# Patient Record
Sex: FEMALE | Race: WHITE | NOT HISPANIC OR LATINO | Employment: OTHER | URBAN - METROPOLITAN AREA
[De-identification: names, ages, dates, MRNs, and addresses within clinical notes are randomized per-mention and may not be internally consistent; named-entity substitution may affect disease eponyms.]

---

## 2023-04-15 LAB — URINE CULTURE: ABNORMAL

## 2023-04-28 LAB — URINE CULTURE: NORMAL

## 2023-08-28 LAB
ANION GAP IN SER/PLAS: 20 MMOL/L (ref 10–20)
CALCIUM (MG/DL) IN SER/PLAS: 10.2 MG/DL (ref 8.6–10.6)
CARBON DIOXIDE, TOTAL (MMOL/L) IN SER/PLAS: 22 MMOL/L (ref 21–32)
CHLORIDE (MMOL/L) IN SER/PLAS: 107 MMOL/L (ref 98–107)
CREATININE (MG/DL) IN SER/PLAS: 1.01 MG/DL (ref 0.5–1.05)
GFR FEMALE: 57 ML/MIN/1.73M2
GLUCOSE (MG/DL) IN SER/PLAS: 145 MG/DL (ref 74–99)
POTASSIUM (MMOL/L) IN SER/PLAS: 3.9 MMOL/L (ref 3.5–5.3)
SODIUM (MMOL/L) IN SER/PLAS: 145 MMOL/L (ref 136–145)
UREA NITROGEN (MG/DL) IN SER/PLAS: 21 MG/DL (ref 6–23)

## 2023-09-08 LAB
ALANINE AMINOTRANSFERASE (SGPT) (U/L) IN SER/PLAS: 36 U/L (ref 7–45)
ALBUMIN (G/DL) IN SER/PLAS: 4 G/DL (ref 3.4–5)
ALKALINE PHOSPHATASE (U/L) IN SER/PLAS: 121 U/L (ref 33–136)
ANION GAP IN SER/PLAS: 17 MMOL/L (ref 10–20)
APPEARANCE, URINE: ABNORMAL
ASPARTATE AMINOTRANSFERASE (SGOT) (U/L) IN SER/PLAS: 28 U/L (ref 9–39)
BACTERIA, URINE: ABNORMAL /HPF
BILIRUBIN TOTAL (MG/DL) IN SER/PLAS: 0.4 MG/DL (ref 0–1.2)
BILIRUBIN, URINE: NEGATIVE
BLOOD, URINE: NEGATIVE
CALCIDIOL (25 OH VITAMIN D3) (NG/ML) IN SER/PLAS: 78 NG/ML
CALCIUM (MG/DL) IN SER/PLAS: 10 MG/DL (ref 8.6–10.6)
CARBON DIOXIDE, TOTAL (MMOL/L) IN SER/PLAS: 25 MMOL/L (ref 21–32)
CHLORIDE (MMOL/L) IN SER/PLAS: 104 MMOL/L (ref 98–107)
CHOLESTEROL (MG/DL) IN SER/PLAS: 254 MG/DL (ref 0–199)
CHOLESTEROL IN HDL (MG/DL) IN SER/PLAS: 43.9 MG/DL
CHOLESTEROL/HDL RATIO: 5.8
COBALAMIN (VITAMIN B12) (PG/ML) IN SER/PLAS: 263 PG/ML (ref 211–911)
COLOR, URINE: YELLOW
CREATININE (MG/DL) IN SER/PLAS: 1.07 MG/DL (ref 0.5–1.05)
ESTIMATED AVERAGE GLUCOSE FOR HBA1C: 146 MG/DL
GFR FEMALE: 53 ML/MIN/1.73M2
GLUCOSE (MG/DL) IN SER/PLAS: 104 MG/DL (ref 74–99)
GLUCOSE, URINE: NEGATIVE MG/DL
HEMOGLOBIN A1C/HEMOGLOBIN TOTAL IN BLOOD: 6.7 %
KETONES, URINE: NEGATIVE MG/DL
LDL: 147 MG/DL (ref 0–99)
LEUKOCYTE ESTERASE, URINE: ABNORMAL
NITRITE, URINE: NEGATIVE
NON HDL CHOLESTEROL: 210 MG/DL
PH, URINE: 6 (ref 5–8)
POTASSIUM (MMOL/L) IN SER/PLAS: 5.1 MMOL/L (ref 3.5–5.3)
PROTEIN TOTAL: 6.5 G/DL (ref 6.4–8.2)
PROTEIN, URINE: NEGATIVE MG/DL
RBC, URINE: 1 /HPF (ref 0–5)
SEDIMENTATION RATE, ERYTHROCYTE: 57 MM/H (ref 0–30)
SODIUM (MMOL/L) IN SER/PLAS: 141 MMOL/L (ref 136–145)
SPECIFIC GRAVITY, URINE: 1.02 (ref 1–1.03)
SQUAMOUS EPITHELIAL CELLS, URINE: 6 /HPF
THYROTROPIN (MIU/L) IN SER/PLAS BY DETECTION LIMIT <= 0.05 MIU/L: 1.16 MIU/L (ref 0.44–3.98)
TRIGLYCERIDE (MG/DL) IN SER/PLAS: 317 MG/DL (ref 0–149)
UREA NITROGEN (MG/DL) IN SER/PLAS: 23 MG/DL (ref 6–23)
UROBILINOGEN, URINE: <2 MG/DL (ref 0–1.9)
VLDL: 63 MG/DL (ref 0–40)
WBC CLUMPS, URINE: ABNORMAL /HPF
WBC, URINE: 24 /HPF (ref 0–5)

## 2023-09-10 LAB — URINE CULTURE: ABNORMAL

## 2023-09-30 PROBLEM — N39.3 FEMALE STRESS INCONTINENCE: Status: ACTIVE | Noted: 2023-09-30

## 2023-09-30 PROBLEM — R32 URINARY INCONTINENCE: Status: ACTIVE | Noted: 2023-09-30

## 2023-09-30 PROBLEM — N95.2 ATROPHIC VAGINITIS: Status: ACTIVE | Noted: 2023-09-30

## 2023-09-30 PROBLEM — N39.41 URGE INCONTINENCE: Status: ACTIVE | Noted: 2023-09-30

## 2023-09-30 PROBLEM — N39.0 CHRONIC UTI: Status: ACTIVE | Noted: 2023-09-30

## 2023-09-30 PROBLEM — N81.89 PELVIC FLOOR WEAKNESS: Status: ACTIVE | Noted: 2023-09-30

## 2023-09-30 RX ORDER — MIRABEGRON 50 MG/1
TABLET, FILM COATED, EXTENDED RELEASE ORAL
Status: ON HOLD | COMMUNITY
Start: 2023-02-06 | End: 2023-10-21 | Stop reason: ALTCHOICE

## 2023-09-30 RX ORDER — FLUTICASONE PROPIONATE AND SALMETEROL 250; 50 UG/1; UG/1
1 POWDER RESPIRATORY (INHALATION)
COMMUNITY
Start: 2023-04-07

## 2023-09-30 RX ORDER — ALBUTEROL SULFATE 90 UG/1
2 AEROSOL, METERED RESPIRATORY (INHALATION)
COMMUNITY

## 2023-09-30 RX ORDER — CIPROFLOXACIN 250 MG/1
1 TABLET, FILM COATED ORAL EVERY 12 HOURS
Status: ON HOLD | COMMUNITY
Start: 2022-11-28 | End: 2023-10-21

## 2023-09-30 RX ORDER — FUROSEMIDE 10 MG/ML
10 SOLUTION ORAL
COMMUNITY
End: 2023-10-23 | Stop reason: HOSPADM

## 2023-09-30 RX ORDER — MONTELUKAST SODIUM 10 MG/1
10 TABLET ORAL DAILY
COMMUNITY

## 2023-09-30 RX ORDER — VIT C/E/ZN/COPPR/LUTEIN/ZEAXAN 250MG-90MG
25 CAPSULE ORAL DAILY
COMMUNITY

## 2023-09-30 RX ORDER — ROSUVASTATIN CALCIUM 5 MG/1
10 TABLET, COATED ORAL DAILY
COMMUNITY

## 2023-09-30 RX ORDER — FLUCONAZOLE 150 MG/1
TABLET ORAL
COMMUNITY
Start: 2022-11-15

## 2023-09-30 RX ORDER — LEFLUNOMIDE 20 MG/1
20 TABLET ORAL DAILY
COMMUNITY

## 2023-09-30 RX ORDER — FAMOTIDINE 20 MG/1
TABLET, FILM COATED ORAL
COMMUNITY

## 2023-09-30 RX ORDER — CETIRIZINE HYDROCHLORIDE 10 MG/1
TABLET ORAL
COMMUNITY
End: 2023-10-23 | Stop reason: HOSPADM

## 2023-09-30 RX ORDER — LEVOTHYROXINE SODIUM 150 UG/1
150 TABLET ORAL
COMMUNITY

## 2023-09-30 RX ORDER — RIVASTIGMINE TARTRATE 1.5 MG/1
1.5 CAPSULE ORAL 2 TIMES DAILY
COMMUNITY

## 2023-09-30 RX ORDER — PREGABALIN 75 MG/1
75 CAPSULE ORAL DAILY
COMMUNITY

## 2023-09-30 RX ORDER — NORTRIPTYLINE HYDROCHLORIDE 50 MG/1
CAPSULE ORAL
COMMUNITY

## 2023-09-30 RX ORDER — FLUTICASONE FUROATE, UMECLIDINIUM BROMIDE AND VILANTEROL TRIFENATATE 200; 62.5; 25 UG/1; UG/1; UG/1
25 POWDER RESPIRATORY (INHALATION) DAILY PRN
COMMUNITY

## 2023-09-30 RX ORDER — ESTRADIOL 2 MG/1
SYSTEM VAGINAL
COMMUNITY
Start: 2022-11-30 | End: 2023-11-10 | Stop reason: SDUPTHER

## 2023-09-30 RX ORDER — ESCITALOPRAM OXALATE 20 MG/1
20 TABLET ORAL DAILY
COMMUNITY

## 2023-09-30 RX ORDER — ESOMEPRAZOLE MAGNESIUM 40 MG/1
40 GRANULE, DELAYED RELEASE ORAL
COMMUNITY

## 2023-09-30 RX ORDER — NITROFURANTOIN 25; 75 MG/1; MG/1
CAPSULE ORAL
COMMUNITY
Start: 2023-04-13 | End: 2023-10-23 | Stop reason: HOSPADM

## 2023-09-30 RX ORDER — NITROFURANTOIN MACROCRYSTALS 50 MG/1
50 CAPSULE ORAL
COMMUNITY
Start: 2022-11-30 | End: 2023-10-23 | Stop reason: HOSPADM

## 2023-10-04 ENCOUNTER — ALLIED HEALTH (OUTPATIENT)
Dept: INTEGRATIVE MEDICINE | Facility: CLINIC | Age: 78
End: 2023-10-04
Payer: MEDICARE

## 2023-10-04 DIAGNOSIS — M54.59 OTHER LOW BACK PAIN: Primary | ICD-10-CM

## 2023-10-04 PROCEDURE — 97811 ACUP 1/> W/O ESTIM EA ADD 15: CPT | Performed by: ACUPUNCTURIST

## 2023-10-04 PROCEDURE — 97810 ACUP 1/> WO ESTIM 1ST 15 MIN: CPT | Performed by: ACUPUNCTURIST

## 2023-10-04 NOTE — PROGRESS NOTES
Follow up visit  2 of 12.  Immediate pt reported benefits of decrease in pain lasted 2 days in duration.  Since last tx pt reports symptoms of low back spreading from left to right hip medium  in intensity and constant in  frequency.   Pt also observed the following changes that pain stops when at rest.  She rests for about 1 hour.  She is able to do some house based activity she can go for awhile.  If activity includes bending she has to rest after 30 min.  Acupuncture Visit:     Subjective   Patient ID: Kenisha Bergeron is a 77 y.o. female who presents for No chief complaint on file.  HPI    Session Information  Is this acupuncture treatment being billed to the patient's insurance company: Yes  This is visit number: 2  The patient has a total number of visits of: 12  Initial Acupuncture Treatment date: 09/13/23  Name of Insurance Company: Medicare A/B  Name of Supervising Physician: ELSIE Strange  Visit Type: Follow-up visit         Review of Systems         Provider reviewed plan for the acupuncture session, precautions and contraindications. Patient/guardian/hospital staff has given consent to treat with full understanding of what to expect during the session. Before acupuncture began, provider explained to the patient to communicate at any time if the procedure was causing discomfort past their tolerance level. Patient agreed to advise acupuncturist. The acupuncturist counseled the patient on the risks of acupuncture treatment including pain, infection, bleeding, and no relief of pain. The patient was positioned comfortably. There was no evidence of infection at the site of needle insertions.    Objective   Physical Exam         Treatment Plan  Treatment Goals:  (decrease the intensity duration and frequency of pain)  Pattern Differentiation: st qi deficiency, oketsu, adrenal imbalance  Treatment Principle: move qi and blood regulate adrenals    Acupuncture Treatment  Patient Position: Supine on a  table  Acupuncture Needling: Yes  Needle Guage: 40 guage /.16/ Red seirin  Body Points: With retention  Body Points - Left: k 7, 10  Body Points - Bilateral: gb 41,lr, 3, sj 5, li4, st qi x2  Body Points - Right: k 3, 9  Auricular Points: No  Electroacupuncture Used: No  Other Techniques Utilized: Topicals  Topicals Description: orange peppermint with spirit quieting oil  Needle Count In: 16  Needle Count Out: 16  Needle Retention Time (min): 25 minutes  Total Face to Face Time (min): 45 minutes              Assessment/Plan

## 2023-10-04 NOTE — PATIENT INSTRUCTIONS
Pt tolerated tx well.   Advised to engage in gentle activity following tx and hydrate.  Schedule 4-6 follow tx with re-evaluation

## 2023-10-18 ENCOUNTER — LAB (OUTPATIENT)
Dept: LAB | Facility: LAB | Age: 78
End: 2023-10-18
Payer: MEDICARE

## 2023-10-18 DIAGNOSIS — N39.0 URINARY TRACT INFECTION, SITE NOT SPECIFIED: Primary | ICD-10-CM

## 2023-10-18 LAB
BACTERIA #/AREA URNS AUTO: ABNORMAL /HPF
MUCOUS THREADS #/AREA URNS AUTO: ABNORMAL /LPF
RBC #/AREA URNS AUTO: ABNORMAL /HPF
SQUAMOUS #/AREA URNS AUTO: ABNORMAL /HPF
WBC #/AREA URNS AUTO: ABNORMAL /HPF
WBC CLUMPS #/AREA URNS AUTO: ABNORMAL /HPF

## 2023-10-18 PROCEDURE — 81001 URINALYSIS AUTO W/SCOPE: CPT

## 2023-10-18 PROCEDURE — 87086 URINE CULTURE/COLONY COUNT: CPT

## 2023-10-18 PROCEDURE — 87186 SC STD MICRODIL/AGAR DIL: CPT

## 2023-10-20 ENCOUNTER — APPOINTMENT (OUTPATIENT)
Dept: RADIOLOGY | Facility: HOSPITAL | Age: 78
DRG: 690 | End: 2023-10-20
Payer: MEDICARE

## 2023-10-20 ENCOUNTER — HOSPITAL ENCOUNTER (INPATIENT)
Facility: HOSPITAL | Age: 78
LOS: 2 days | Discharge: HOME | DRG: 690 | End: 2023-10-23
Attending: GENERAL PRACTICE | Admitting: INTERNAL MEDICINE
Payer: MEDICARE

## 2023-10-20 DIAGNOSIS — A41.4: Primary | ICD-10-CM

## 2023-10-20 DIAGNOSIS — Z16.24 NEWLY DIAGNOSED INFECTION DUE TO MULTIDRUG RESISTANT ORGANISM: ICD-10-CM

## 2023-10-20 DIAGNOSIS — I10 PRIMARY HYPERTENSION: ICD-10-CM

## 2023-10-20 LAB
ALBUMIN SERPL BCP-MCNC: 3.8 G/DL (ref 3.4–5)
ALP SERPL-CCNC: 113 U/L (ref 33–136)
ALT SERPL W P-5'-P-CCNC: 37 U/L (ref 7–45)
ANION GAP SERPL CALC-SCNC: 15 MMOL/L (ref 10–20)
APPEARANCE UR: ABNORMAL
AST SERPL W P-5'-P-CCNC: 36 U/L (ref 9–39)
BACTERIA #/AREA URNS AUTO: ABNORMAL /HPF
BACTERIA UR CULT: ABNORMAL
BASOPHILS # BLD AUTO: 0.15 X10*3/UL (ref 0–0.1)
BASOPHILS NFR BLD AUTO: 0.9 %
BILIRUB SERPL-MCNC: 0.3 MG/DL (ref 0–1.2)
BILIRUB UR STRIP.AUTO-MCNC: NEGATIVE MG/DL
BUN SERPL-MCNC: 25 MG/DL (ref 6–23)
CALCIUM SERPL-MCNC: 8.7 MG/DL (ref 8.6–10.3)
CHLORIDE SERPL-SCNC: 106 MMOL/L (ref 98–107)
CO2 SERPL-SCNC: 21 MMOL/L (ref 21–32)
COLOR UR: YELLOW
CREAT SERPL-MCNC: 1.1 MG/DL (ref 0.5–1.05)
EOSINOPHIL # BLD AUTO: 0.2 X10*3/UL (ref 0–0.4)
EOSINOPHIL NFR BLD AUTO: 1.2 %
ERYTHROCYTE [DISTWIDTH] IN BLOOD BY AUTOMATED COUNT: 14.2 % (ref 11.5–14.5)
GFR SERPL CREATININE-BSD FRML MDRD: 52 ML/MIN/1.73M*2
GLUCOSE SERPL-MCNC: 144 MG/DL (ref 74–99)
GLUCOSE UR STRIP.AUTO-MCNC: NEGATIVE MG/DL
HCT VFR BLD AUTO: 44.2 % (ref 36–46)
HGB BLD-MCNC: 13.7 G/DL (ref 12–16)
HOLD SPECIMEN: 293
IMM GRANULOCYTES # BLD AUTO: 0.23 X10*3/UL (ref 0–0.5)
IMM GRANULOCYTES NFR BLD AUTO: 1.3 % (ref 0–0.9)
KETONES UR STRIP.AUTO-MCNC: NEGATIVE MG/DL
LEUKOCYTE ESTERASE UR QL STRIP.AUTO: ABNORMAL
LYMPHOCYTES # BLD AUTO: 4.41 X10*3/UL (ref 0.8–3)
LYMPHOCYTES NFR BLD AUTO: 25.4 %
MCH RBC QN AUTO: 28 PG (ref 26–34)
MCHC RBC AUTO-ENTMCNC: 31 G/DL (ref 32–36)
MCV RBC AUTO: 90 FL (ref 80–100)
MONOCYTES # BLD AUTO: 1.94 X10*3/UL (ref 0.05–0.8)
MONOCYTES NFR BLD AUTO: 11.2 %
MUCOUS THREADS #/AREA URNS AUTO: ABNORMAL /LPF
NEUTROPHILS # BLD AUTO: 10.44 X10*3/UL (ref 1.6–5.5)
NEUTROPHILS NFR BLD AUTO: 60 %
NITRITE UR QL STRIP.AUTO: NEGATIVE
NRBC BLD-RTO: 0 /100 WBCS (ref 0–0)
PH UR STRIP.AUTO: 5 [PH]
PLATELET # BLD AUTO: 322 X10*3/UL (ref 150–450)
PMV BLD AUTO: 10.5 FL (ref 7.5–11.5)
POTASSIUM SERPL-SCNC: 4.1 MMOL/L (ref 3.5–5.3)
PROT SERPL-MCNC: 6.3 G/DL (ref 6.4–8.2)
PROT UR STRIP.AUTO-MCNC: NEGATIVE MG/DL
RBC # BLD AUTO: 4.89 X10*6/UL (ref 4–5.2)
RBC # UR STRIP.AUTO: NEGATIVE /UL
RBC #/AREA URNS AUTO: ABNORMAL /HPF
SODIUM SERPL-SCNC: 138 MMOL/L (ref 136–145)
SP GR UR STRIP.AUTO: 1.01
SQUAMOUS #/AREA URNS AUTO: ABNORMAL /HPF
UROBILINOGEN UR STRIP.AUTO-MCNC: <2 MG/DL
WBC # BLD AUTO: 17.4 X10*3/UL (ref 4.4–11.3)
WBC #/AREA URNS AUTO: ABNORMAL /HPF

## 2023-10-20 PROCEDURE — 81001 URINALYSIS AUTO W/SCOPE: CPT | Performed by: GENERAL PRACTICE

## 2023-10-20 PROCEDURE — 36415 COLL VENOUS BLD VENIPUNCTURE: CPT | Performed by: PHYSICIAN ASSISTANT

## 2023-10-20 PROCEDURE — 85025 COMPLETE CBC W/AUTO DIFF WBC: CPT | Performed by: PHYSICIAN ASSISTANT

## 2023-10-20 PROCEDURE — 2550000001 HC RX 255 CONTRASTS: Performed by: GENERAL PRACTICE

## 2023-10-20 PROCEDURE — 80053 COMPREHEN METABOLIC PANEL: CPT | Performed by: PHYSICIAN ASSISTANT

## 2023-10-20 PROCEDURE — 99285 EMERGENCY DEPT VISIT HI MDM: CPT | Mod: 25 | Performed by: GENERAL PRACTICE

## 2023-10-20 PROCEDURE — 2500000004 HC RX 250 GENERAL PHARMACY W/ HCPCS (ALT 636 FOR OP/ED): Performed by: PHYSICIAN ASSISTANT

## 2023-10-20 PROCEDURE — 81001 URINALYSIS AUTO W/SCOPE: CPT | Performed by: EMERGENCY MEDICINE

## 2023-10-20 PROCEDURE — 87040 BLOOD CULTURE FOR BACTERIA: CPT | Mod: AHULAB,CMCLAB | Performed by: PHYSICIAN ASSISTANT

## 2023-10-20 PROCEDURE — 74177 CT ABD & PELVIS W/CONTRAST: CPT | Performed by: RADIOLOGY

## 2023-10-20 PROCEDURE — 83605 ASSAY OF LACTIC ACID: CPT | Performed by: PHYSICIAN ASSISTANT

## 2023-10-20 PROCEDURE — 87086 URINE CULTURE/COLONY COUNT: CPT | Mod: AHULAB,CMCLAB | Performed by: GENERAL PRACTICE

## 2023-10-20 PROCEDURE — 74177 CT ABD & PELVIS W/CONTRAST: CPT | Mod: ME

## 2023-10-20 PROCEDURE — 86140 C-REACTIVE PROTEIN: CPT | Performed by: PHYSICIAN ASSISTANT

## 2023-10-20 PROCEDURE — 96365 THER/PROPH/DIAG IV INF INIT: CPT

## 2023-10-20 RX ORDER — MEROPENEM 1 G/1
1 INJECTION, POWDER, FOR SOLUTION INTRAVENOUS ONCE
Status: COMPLETED | OUTPATIENT
Start: 2023-10-20 | End: 2023-10-21

## 2023-10-20 RX ADMIN — MEROPENEM 1 G: 1 INJECTION, POWDER, FOR SOLUTION INTRAVENOUS at 23:06

## 2023-10-20 RX ADMIN — IOHEXOL 75 ML: 350 INJECTION, SOLUTION INTRAVENOUS at 23:43

## 2023-10-20 ASSESSMENT — PAIN - FUNCTIONAL ASSESSMENT: PAIN_FUNCTIONAL_ASSESSMENT: 0-10

## 2023-10-20 NOTE — Clinical Note
ED UM Review Complete - Patient Meets Inpatient Criteria  [email or web address]@

## 2023-10-20 NOTE — ED TRIAGE NOTES
Pt ambulatory to triage c/o UTI, pt has frequency, low abd pain.    Pt got Urine cultures back from [facility] and is resistant to many antibiotics, Primary told her to come for IV antibiotics/admit

## 2023-10-21 PROBLEM — A41.4: Status: ACTIVE | Noted: 2023-10-21

## 2023-10-21 LAB
APTT PPP: 35 SECONDS (ref 27–38)
BNP SERPL-MCNC: 32 PG/ML (ref 0–99)
CRP SERPL-MCNC: 1.68 MG/DL
INR PPP: 1.2 (ref 0.9–1.1)
LACTATE SERPL-SCNC: 1.9 MMOL/L (ref 0.4–2)
LACTATE SERPL-SCNC: 2.2 MMOL/L (ref 0.4–2)
PROTHROMBIN TIME: 13.7 SECONDS (ref 9.8–12.8)

## 2023-10-21 PROCEDURE — 1100000001 HC PRIVATE ROOM DAILY

## 2023-10-21 PROCEDURE — 85610 PROTHROMBIN TIME: CPT | Performed by: PHYSICIAN ASSISTANT

## 2023-10-21 PROCEDURE — 85730 THROMBOPLASTIN TIME PARTIAL: CPT | Performed by: PHYSICIAN ASSISTANT

## 2023-10-21 PROCEDURE — 2500000004 HC RX 250 GENERAL PHARMACY W/ HCPCS (ALT 636 FOR OP/ED): Performed by: INTERNAL MEDICINE

## 2023-10-21 PROCEDURE — 99222 1ST HOSP IP/OBS MODERATE 55: CPT | Performed by: INTERNAL MEDICINE

## 2023-10-21 PROCEDURE — 2500000004 HC RX 250 GENERAL PHARMACY W/ HCPCS (ALT 636 FOR OP/ED): Performed by: PHYSICIAN ASSISTANT

## 2023-10-21 PROCEDURE — 2500000001 HC RX 250 WO HCPCS SELF ADMINISTERED DRUGS (ALT 637 FOR MEDICARE OP): Performed by: INTERNAL MEDICINE

## 2023-10-21 PROCEDURE — 83880 ASSAY OF NATRIURETIC PEPTIDE: CPT | Performed by: PHYSICIAN ASSISTANT

## 2023-10-21 PROCEDURE — 83605 ASSAY OF LACTIC ACID: CPT | Performed by: PHYSICIAN ASSISTANT

## 2023-10-21 PROCEDURE — 36415 COLL VENOUS BLD VENIPUNCTURE: CPT | Performed by: PHYSICIAN ASSISTANT

## 2023-10-21 RX ORDER — ROSUVASTATIN CALCIUM 10 MG/1
10 TABLET, COATED ORAL NIGHTLY
Status: DISCONTINUED | OUTPATIENT
Start: 2023-10-21 | End: 2023-10-23 | Stop reason: HOSPADM

## 2023-10-21 RX ORDER — ESTRADIOL 0.1 MG/G
2 CREAM VAGINAL NIGHTLY
Status: DISCONTINUED | OUTPATIENT
Start: 2023-10-21 | End: 2023-10-21

## 2023-10-21 RX ORDER — BUDESONIDE 0.5 MG/2ML
0.5 INHALANT ORAL
Status: DISCONTINUED | OUTPATIENT
Start: 2023-10-21 | End: 2023-10-23 | Stop reason: HOSPADM

## 2023-10-21 RX ORDER — RIVASTIGMINE TARTRATE 1.5 MG/1
1.5 CAPSULE ORAL 2 TIMES DAILY
Status: DISCONTINUED | OUTPATIENT
Start: 2023-10-21 | End: 2023-10-23 | Stop reason: HOSPADM

## 2023-10-21 RX ORDER — PANTOPRAZOLE SODIUM 40 MG/1
40 TABLET, DELAYED RELEASE ORAL
Status: DISCONTINUED | OUTPATIENT
Start: 2023-10-22 | End: 2023-10-23 | Stop reason: HOSPADM

## 2023-10-21 RX ORDER — MEROPENEM 1 G/1
1 INJECTION, POWDER, FOR SOLUTION INTRAVENOUS EVERY 12 HOURS
Status: DISCONTINUED | OUTPATIENT
Start: 2023-10-21 | End: 2023-10-21

## 2023-10-21 RX ORDER — MEROPENEM 1 G/1
1 INJECTION, POWDER, FOR SOLUTION INTRAVENOUS ONCE
Status: DISCONTINUED | OUTPATIENT
Start: 2023-10-21 | End: 2023-10-22 | Stop reason: ALTCHOICE

## 2023-10-21 RX ORDER — ACETAMINOPHEN 325 MG/1
975 TABLET ORAL ONCE
Status: COMPLETED | OUTPATIENT
Start: 2023-10-21 | End: 2023-10-21

## 2023-10-21 RX ORDER — FLUTICASONE FUROATE AND VILANTEROL 100; 25 UG/1; UG/1
1 POWDER RESPIRATORY (INHALATION)
Status: DISCONTINUED | OUTPATIENT
Start: 2023-10-22 | End: 2023-10-21

## 2023-10-21 RX ORDER — LORATADINE 10 MG/1
10 TABLET ORAL DAILY
Status: DISCONTINUED | OUTPATIENT
Start: 2023-10-21 | End: 2023-10-23 | Stop reason: HOSPADM

## 2023-10-21 RX ORDER — PREGABALIN 75 MG/1
75 CAPSULE ORAL NIGHTLY
Status: DISCONTINUED | OUTPATIENT
Start: 2023-10-21 | End: 2023-10-23 | Stop reason: HOSPADM

## 2023-10-21 RX ORDER — ALBUTEROL SULFATE 90 UG/1
2 AEROSOL, METERED RESPIRATORY (INHALATION) EVERY 4 HOURS PRN
Status: DISCONTINUED | OUTPATIENT
Start: 2023-10-21 | End: 2023-10-21

## 2023-10-21 RX ORDER — LEFLUNOMIDE 10 MG/1
20 TABLET ORAL DAILY
Status: DISCONTINUED | OUTPATIENT
Start: 2023-10-21 | End: 2023-10-23 | Stop reason: HOSPADM

## 2023-10-21 RX ORDER — MONTELUKAST SODIUM 10 MG/1
10 TABLET ORAL NIGHTLY
Status: DISCONTINUED | OUTPATIENT
Start: 2023-10-21 | End: 2023-10-23 | Stop reason: HOSPADM

## 2023-10-21 RX ORDER — ALBUTEROL SULFATE 0.83 MG/ML
2.5 SOLUTION RESPIRATORY (INHALATION) EVERY 6 HOURS PRN
Status: DISCONTINUED | OUTPATIENT
Start: 2023-10-21 | End: 2023-10-23 | Stop reason: HOSPADM

## 2023-10-21 RX ORDER — ESCITALOPRAM OXALATE 20 MG/1
20 TABLET ORAL DAILY
Status: DISCONTINUED | OUTPATIENT
Start: 2023-10-21 | End: 2023-10-23 | Stop reason: HOSPADM

## 2023-10-21 RX ORDER — CHOLECALCIFEROL (VITAMIN D3) 25 MCG
1000 TABLET ORAL DAILY
Status: DISCONTINUED | OUTPATIENT
Start: 2023-10-22 | End: 2023-10-23 | Stop reason: HOSPADM

## 2023-10-21 RX ORDER — VIT C/E/ZN/COPPR/LUTEIN/ZEAXAN 250MG-90MG
1000 CAPSULE ORAL DAILY
Status: DISCONTINUED | OUTPATIENT
Start: 2023-10-21 | End: 2023-10-21

## 2023-10-21 RX ORDER — FAMOTIDINE 20 MG/1
20 TABLET, FILM COATED ORAL DAILY
Status: DISCONTINUED | OUTPATIENT
Start: 2023-10-21 | End: 2023-10-23 | Stop reason: HOSPADM

## 2023-10-21 RX ORDER — NORTRIPTYLINE HYDROCHLORIDE 25 MG/1
50 CAPSULE ORAL NIGHTLY
Status: DISCONTINUED | OUTPATIENT
Start: 2023-10-21 | End: 2023-10-23 | Stop reason: HOSPADM

## 2023-10-21 RX ORDER — FORMOTEROL FUMARATE DIHYDRATE 20 UG/2ML
20 SOLUTION RESPIRATORY (INHALATION)
Status: DISCONTINUED | OUTPATIENT
Start: 2023-10-21 | End: 2023-10-23 | Stop reason: HOSPADM

## 2023-10-21 RX ORDER — ESOMEPRAZOLE MAGNESIUM 40 MG/1
40 GRANULE, DELAYED RELEASE ORAL
Status: DISCONTINUED | OUTPATIENT
Start: 2023-10-22 | End: 2023-10-21

## 2023-10-21 RX ORDER — LEVOTHYROXINE SODIUM 75 UG/1
150 TABLET ORAL DAILY
Status: DISCONTINUED | OUTPATIENT
Start: 2023-10-22 | End: 2023-10-23 | Stop reason: HOSPADM

## 2023-10-21 RX ADMIN — SODIUM CHLORIDE 1000 ML: 9 INJECTION, SOLUTION INTRAVENOUS at 00:04

## 2023-10-21 RX ADMIN — ACETAMINOPHEN 975 MG: 325 TABLET ORAL at 02:15

## 2023-10-21 RX ADMIN — RIVASTIGMINE TARTRATE 1.5 MG: 1.5 CAPSULE ORAL at 22:07

## 2023-10-21 RX ADMIN — MONTELUKAST 10 MG: 10 TABLET, FILM COATED ORAL at 22:07

## 2023-10-21 RX ADMIN — ROSUVASTATIN 10 MG: 10 TABLET, FILM COATED ORAL at 22:06

## 2023-10-21 RX ADMIN — ACETAMINOPHEN 975 MG: 325 TABLET ORAL at 22:06

## 2023-10-21 RX ADMIN — PREGABALIN 75 MG: 75 CAPSULE ORAL at 22:06

## 2023-10-21 RX ADMIN — APIXABAN 5 MG: 5 TABLET, FILM COATED ORAL at 22:07

## 2023-10-21 RX ADMIN — Medication 1 G: at 12:04

## 2023-10-21 SDOH — SOCIAL STABILITY: SOCIAL INSECURITY: ARE YOU OR HAVE YOU BEEN THREATENED OR ABUSED PHYSICALLY, EMOTIONALLY, OR SEXUALLY BY ANYONE?: NO

## 2023-10-21 SDOH — SOCIAL STABILITY: SOCIAL INSECURITY: ABUSE: ADULT

## 2023-10-21 SDOH — SOCIAL STABILITY: SOCIAL INSECURITY: DOES ANYONE TRY TO KEEP YOU FROM HAVING/CONTACTING OTHER FRIENDS OR DOING THINGS OUTSIDE YOUR HOME?: NO

## 2023-10-21 SDOH — SOCIAL STABILITY: SOCIAL INSECURITY: HAS ANYONE EVER THREATENED TO HURT YOUR FAMILY OR YOUR PETS?: NO

## 2023-10-21 SDOH — SOCIAL STABILITY: SOCIAL INSECURITY: HAVE YOU HAD THOUGHTS OF HARMING ANYONE ELSE?: NO

## 2023-10-21 SDOH — HEALTH STABILITY: MENTAL HEALTH: EXPERIENCED ANY OF THE FOLLOWING LIFE EVENTS: DEATH OF FAMILY/FRIEND

## 2023-10-21 SDOH — SOCIAL STABILITY: SOCIAL INSECURITY: WERE YOU ABLE TO COMPLETE ALL THE BEHAVIORAL HEALTH SCREENINGS?: YES

## 2023-10-21 SDOH — SOCIAL STABILITY: SOCIAL INSECURITY: DO YOU FEEL UNSAFE GOING BACK TO THE PLACE WHERE YOU ARE LIVING?: NO

## 2023-10-21 SDOH — SOCIAL STABILITY: SOCIAL INSECURITY: ARE THERE ANY APPARENT SIGNS OF INJURIES/BEHAVIORS THAT COULD BE RELATED TO ABUSE/NEGLECT?: NO

## 2023-10-21 SDOH — SOCIAL STABILITY: SOCIAL INSECURITY: DO YOU FEEL ANYONE HAS EXPLOITED OR TAKEN ADVANTAGE OF YOU FINANCIALLY OR OF YOUR PERSONAL PROPERTY?: NO

## 2023-10-21 ASSESSMENT — ENCOUNTER SYMPTOMS
RESPIRATORY NEGATIVE: 1
PSYCHIATRIC NEGATIVE: 1
MUSCULOSKELETAL NEGATIVE: 1
CONSTITUTIONAL NEGATIVE: 1
ENDOCRINE NEGATIVE: 1
ABDOMINAL PAIN: 1
NEUROLOGICAL NEGATIVE: 1
EYES NEGATIVE: 1
HEMATOLOGIC/LYMPHATIC NEGATIVE: 1
ALLERGIC/IMMUNOLOGIC NEGATIVE: 1
CARDIOVASCULAR NEGATIVE: 1

## 2023-10-21 ASSESSMENT — LIFESTYLE VARIABLES
HOW MANY STANDARD DRINKS CONTAINING ALCOHOL DO YOU HAVE ON A TYPICAL DAY: PATIENT DOES NOT DRINK
HOW OFTEN DO YOU HAVE A DRINK CONTAINING ALCOHOL: MONTHLY OR LESS
PRESCIPTION_ABUSE_PAST_12_MONTHS: NO
SUBSTANCE_ABUSE_PAST_12_MONTHS: NO
AUDIT-C TOTAL SCORE: 1
AUDIT-C TOTAL SCORE: 1
SKIP TO QUESTIONS 9-10: 1
HOW OFTEN DO YOU HAVE 6 OR MORE DRINKS ON ONE OCCASION: NEVER

## 2023-10-21 ASSESSMENT — ACTIVITIES OF DAILY LIVING (ADL)
TOILETING: INDEPENDENT
WALKS IN HOME: INDEPENDENT
HEARING - LEFT EAR: FUNCTIONAL
FEEDING YOURSELF: INDEPENDENT
DRESSING YOURSELF: INDEPENDENT
PATIENT'S MEMORY ADEQUATE TO SAFELY COMPLETE DAILY ACTIVITIES?: YES
BATHING: INDEPENDENT
GROOMING: INDEPENDENT
HEARING - RIGHT EAR: FUNCTIONAL
JUDGMENT_ADEQUATE_SAFELY_COMPLETE_DAILY_ACTIVITIES: YES
ADEQUATE_TO_COMPLETE_ADL: YES

## 2023-10-21 ASSESSMENT — COGNITIVE AND FUNCTIONAL STATUS - GENERAL
STANDING UP FROM CHAIR USING ARMS: A LITTLE
MOBILITY SCORE: 20
DAILY ACTIVITIY SCORE: 24
WALKING IN HOSPITAL ROOM: A LITTLE
MOVING TO AND FROM BED TO CHAIR: A LITTLE
PATIENT BASELINE BEDBOUND: NO
CLIMB 3 TO 5 STEPS WITH RAILING: A LITTLE

## 2023-10-21 ASSESSMENT — PATIENT HEALTH QUESTIONNAIRE - PHQ9
2. FEELING DOWN, DEPRESSED OR HOPELESS: NOT AT ALL
SUM OF ALL RESPONSES TO PHQ9 QUESTIONS 1 & 2: 0
1. LITTLE INTEREST OR PLEASURE IN DOING THINGS: NOT AT ALL

## 2023-10-21 ASSESSMENT — PAIN SCALES - GENERAL
PAINLEVEL_OUTOF10: 0 - NO PAIN
PAINLEVEL_OUTOF10: 0 - NO PAIN
PAINLEVEL_OUTOF10: 3

## 2023-10-21 NOTE — CONSULTS
"INFECTIOUS DISEASE INPATIENT INITIAL CONSULTATION    Referred By: Jose Payton    Reason For Consult: Multidrug-resistant UTI     HPI:  This is a 77 y.o. female with PMH of obesity, NARA, RA, HTN, DLD, GERD who presented with low abd pain for a few days.    Was having suprapubic pain improving now. No dysuria. No fevers.    She takes Macrobid as UTI ppx. Admitted for IV abx given history of MDR UTIs.    Here is afebrile. WBC is 17. On IV Meropenem. Urine culture from 10/18 grew ESBL Klebsiella. CT A/P without acute process.     Allergies:  Amoxicillin, Penicillins, Povidone-iodine, and Sulfa (sulfonamide antibiotics)     Vitals (Last 24 Hours):  Heart Rate:  []   Temp:  [35.4 °C (95.8 °F)-36.9 °C (98.5 °F)]   Resp:  [14-23]   BP: (122-158)/()   Height:  [165.1 cm (5' 5\")]   Weight:  [99.8 kg (220 lb)]   SpO2:  [93 %-100 %]      PHYSICAL EXAM:  Gen - NAD  Heart - RRR, no murmurs  Lungs - clear bilaterally, no wheezing  Abd - soft, no ttp, BS present  Ext - no LE edema  Skin - no rash    MEDS:    Current Facility-Administered Medications:     meropenem (Merrem) in sodium chloride 0.9 % 100 mL IV 1 g, 1 g, intravenous, q12h, Jose RONI Payton DO    oxygen (O2) therapy, , inhalation, Continuous PRN - O2/gases, Elena Andersen PA-C, Rate Verify at 10/21/23 0340    sodium chloride 0.9 % bolus 1,000 mL, 1,000 mL, intravenous, Once, Elena Andersen PA-C     LABS:  Lab Results   Component Value Date    WBC 17.4 (H) 10/20/2023    HGB 13.7 10/20/2023    HCT 44.2 10/20/2023    MCV 90 10/20/2023     10/20/2023      Results from last 72 hours   Lab Units 10/20/23  2234   SODIUM mmol/L 138   POTASSIUM mmol/L 4.1   CHLORIDE mmol/L 106   CO2 mmol/L 21   BUN mg/dL 25*   CREATININE mg/dL 1.10*   GLUCOSE mg/dL 144*   CALCIUM mg/dL 8.7   ANION GAP mmol/L 15   EGFR mL/min/1.73m*2 52*     Results from last 72 hours   Lab Units 10/20/23  2234   ALK PHOS U/L 113   BILIRUBIN TOTAL mg/dL 0.3   PROTEIN TOTAL g/dL 6.3* "   ALT U/L 37   AST U/L 36   ALBUMIN g/dL 3.8     Estimated Creatinine Clearance: 49.3 mL/min (A) (by C-G formula based on SCr of 1.1 mg/dL (H)).  C-Reactive Protein   Date/Time Value Ref Range Status   10/20/2023 10:34 PM 1.68 (H) <1.00 mg/dL Final     Sedimentation Rate   Date/Time Value Ref Range Status   09/08/2023 12:42 PM 57 (H) 0 - 30 mm/h Final       IMAGING:  CT A/P  IMPRESSION:  No acute abdominal or pelvic process.      Hepatomegaly and hepatic steatosis.    ASSESSMENT/PLAN:    UTI due to ESBL Klebsiella - complicated given leukocytosis  Penicillin Allergy - will monitor for potential reaction with carbapenems although so far is OK    Abx options are quite limited given resistance and penicillin allergy. Will place on IV Ertapenem 1g Q24H. Plan for 3D course as long as she is improving, will end on Monday morning. This can with at home hospital if needed.    Will follow peripherally over the weekend. Please call me with questions. Thanks! D/w Dr. Heath Ingram MD  [facility]  Office #[phone]

## 2023-10-21 NOTE — H&P
History Of Present Illness  Kenisha Bergeron is a 77 y.o. female presenting with  past medical history of chronic UTIs, Class II obesity BMI 36.6hypothyroidism, hypertension, hyperlipidemia, GERD, rheumatoid arthritis and obstructive sleep apnea on CPAP who presented to [facility] complaining of lower abdominal pain for the past few days.  He was referred here by her primary care physician for IV antibiotics because she has a penicillin allergy and a history of multidrug-resistant UTIs.  She is on prophylactic antibiotics with nitrofurantoin.  She denies any fever or chills shortness of breath chest pain or diarrhea     Past Medical History  Obstructive sleep apnea  Ovarian cyst status post total abdominal hysterectomy    Surgical History  Past Surgical History:   Procedure Laterality Date    OTHER SURGICAL HISTORY  07/25/2022    Knee surgery    OTHER SURGICAL HISTORY  07/25/2022    Hysterectomy    OTHER SURGICAL HISTORY  07/25/2022    Tonsillectomy    OTHER SURGICAL HISTORY  07/25/2022    Foot surgery    OTHER SURGICAL HISTORY  07/25/2022    Jaw surgery    OTHER SURGICAL HISTORY  07/25/2022    Mid-urethral sling insertion         Social History  She reports that she has never smoked. She has never used smokeless tobacco. She reports that she does not drink alcohol and does not use drugs.    Family History  Family History   Problem Relation Name Age of Onset    Rheum arthritis Mother          with positive rheumatoid factor, involving unspecified site    Stomach cancer Father's Brother          Allergies  Amoxicillin, Penicillins, Povidone-iodine, and Sulfa (sulfonamide antibiotics)    Review of Systems   Constitutional: Negative.    HENT: Negative.     Eyes: Negative.    Respiratory: Negative.     Cardiovascular: Negative.    Gastrointestinal:  Positive for abdominal pain.   Endocrine: Negative.    Genitourinary: Negative.    Musculoskeletal: Negative.    Skin: Negative.    Allergic/Immunologic:  "Negative.    Neurological: Negative.    Hematological: Negative.    Psychiatric/Behavioral: Negative.     All other systems reviewed and are negative.       Physical Exam     Last Recorded Vitals  Blood pressure 153/80, pulse 106, temperature 36.7 °C (98.1 °F), temperature source Oral, resp. rate 18, height 1.651 m (5' 5\"), weight 99.8 kg (220 lb), SpO2 93 %.    Relevant Results  Meds:  Scheduled medications  meropenem, 1 g, intravenous, q12h  sodium chloride, 1,000 mL, intravenous, Once      Continuous medications     PRN medications  PRN medications: oxygen   Current Outpatient Medications   Medication Instructions    albuterol (Ventolin HFA) 90 mcg/actuation inhaler inhalation    apixaban (Eliquis) 5 mg tablet oral    cetirizine (ZyrTEC) 10 mg tablet oral    cholecalciferol (Vitamin D-3) 25 MCG (1000 UT) capsule oral    escitalopram (Lexapro) 20 mg tablet oral    esomeprazole (NexIUM Packet) 40 mg packet oral    estradiol (Estring) 2 mg (7.5 mcg /24 hour) vaginal ring INSERT 1 RING INTRAVAGINALLY EVERY 3 MONTHS.    famotidine (Pepcid) 20 mg tablet Famotidine 20 MG Oral Tablet   Refills: 0       Active    fluconazole (Diflucan) 150 mg tablet TAKE 1 TABLET NOW AND AGAIN IN 3 DAYS. REPEAT MONTHLY.    fluticasone-umeclidin-vilanter (Trelegy Ellipta) 200-62.5-25 mcg blister with device inhalation    furosemide (Lasix) 10 mg/mL solution oral    leflunomide (Arava) 20 mg tablet oral    levothyroxine (Synthroid, Levoxyl) 150 mcg tablet oral    montelukast (Singulair) 10 mg tablet oral    nitrofurantoin (Macrodantin) 50 mg capsule TAKE 1 CAPSULE Daily AFTER COMPLETING CIPROFLOXACIN    nitrofurantoin, macrocrystal-monohydrate, (Macrobid) 100 mg capsule     nortriptyline (Pamelor) 50 mg capsule oral    pregabalin (Lyrica) 75 mg capsule oral    rivastigmine (Exelon) 1.5 mg capsule oral    rosuvastatin (Crestor) 5 mg tablet oral    Wixela Inhub 250-50 mcg/dose diskus inhaler         Labs:  Results for orders placed or " performed during the hospital encounter of 10/20/23 (from the past 24 hour(s))   Urinalysis with Reflex Microscopic and Culture   Result Value Ref Range    Color, Urine Yellow Straw, Yellow    Appearance, Urine Hazy (N) Clear    Specific Gravity, Urine 1.011 1.005 - 1.035    pH, Urine 5.0 5.0, 5.5, 6.0, 6.5, 7.0, 7.5, 8.0    Protein, Urine NEGATIVE NEGATIVE mg/dL    Glucose, Urine NEGATIVE NEGATIVE mg/dL    Blood, Urine NEGATIVE NEGATIVE    Ketones, Urine NEGATIVE NEGATIVE mg/dL    Bilirubin, Urine NEGATIVE NEGATIVE    Urobilinogen, Urine <2.0 <2.0 mg/dL    Nitrite, Urine NEGATIVE NEGATIVE    Leukocyte Esterase, Urine SMALL (1+) (A) NEGATIVE   Extra Urine Gray Tube   Result Value Ref Range    Extra Tube 293    Microscopic Only, Urine   Result Value Ref Range    WBC, Urine 6-10 (A) 1-5, NONE /HPF    RBC, Urine 1-2 NONE, 1-2, 3-5 /HPF    Squamous Epithelial Cells, Urine 1-9 (SPARSE) Reference range not established. /HPF    Bacteria, Urine 4+ (A) NONE SEEN /HPF    Mucus, Urine 2+ Reference range not established. /LPF   CBC and Auto Differential   Result Value Ref Range    WBC 17.4 (H) 4.4 - 11.3 x10*3/uL    nRBC 0.0 0.0 - 0.0 /100 WBCs    RBC 4.89 4.00 - 5.20 x10*6/uL    Hemoglobin 13.7 12.0 - 16.0 g/dL    Hematocrit 44.2 36.0 - 46.0 %    MCV 90 80 - 100 fL    MCH 28.0 26.0 - 34.0 pg    MCHC 31.0 (L) 32.0 - 36.0 g/dL    RDW 14.2 11.5 - 14.5 %    Platelets 322 150 - 450 x10*3/uL    MPV 10.5 7.5 - 11.5 fL    Neutrophils % 60.0 40.0 - 80.0 %    Immature Granulocytes %, Automated 1.3 (H) 0.0 - 0.9 %    Lymphocytes % 25.4 13.0 - 44.0 %    Monocytes % 11.2 2.0 - 10.0 %    Eosinophils % 1.2 0.0 - 6.0 %    Basophils % 0.9 0.0 - 2.0 %    Neutrophils Absolute 10.44 (H) 1.60 - 5.50 x10*3/uL    Immature Granulocytes Absolute, Automated 0.23 0.00 - 0.50 x10*3/uL    Lymphocytes Absolute 4.41 (H) 0.80 - 3.00 x10*3/uL    Monocytes Absolute 1.94 (H) 0.05 - 0.80 x10*3/uL    Eosinophils Absolute 0.20 0.00 - 0.40 x10*3/uL    Basophils  Absolute 0.15 (H) 0.00 - 0.10 x10*3/uL   Comprehensive metabolic panel   Result Value Ref Range    Glucose 144 (H) 74 - 99 mg/dL    Sodium 138 136 - 145 mmol/L    Potassium 4.1 3.5 - 5.3 mmol/L    Chloride 106 98 - 107 mmol/L    Bicarbonate 21 21 - 32 mmol/L    Anion Gap 15 10 - 20 mmol/L    Urea Nitrogen 25 (H) 6 - 23 mg/dL    Creatinine 1.10 (H) 0.50 - 1.05 mg/dL    eGFR 52 (L) >60 mL/min/1.73m*2    Calcium 8.7 8.6 - 10.3 mg/dL    Albumin 3.8 3.4 - 5.0 g/dL    Alkaline Phosphatase 113 33 - 136 U/L    Total Protein 6.3 (L) 6.4 - 8.2 g/dL    AST 36 9 - 39 U/L    Bilirubin, Total 0.3 0.0 - 1.2 mg/dL    ALT 37 7 - 45 U/L   C-reactive protein   Result Value Ref Range    C-Reactive Protein 1.68 (H) <1.00 mg/dL   Lactate   Result Value Ref Range    Lactate 2.2 (H) 0.4 - 2.0 mmol/L   Lactate   Result Value Ref Range    Lactate 1.9 0.4 - 2.0 mmol/L   Protime-INR   Result Value Ref Range    Protime 13.7 (H) 9.8 - 12.8 seconds    INR 1.2 (H) 0.9 - 1.1   APTT   Result Value Ref Range    aPTT 35 27 - 38 seconds   B-type natriuretic peptide   Result Value Ref Range    BNP 32 0 - 99 pg/mL      Imaging:  CT abdomen pelvis w IV contrast    Result Date: 10/21/2023  Interpreted By:  Melony Fox, STUDY: CT ABDOMEN PELVIS W IV CONTRAST;  10/20/2023 11:49 pm   INDICATION: Signs/Symptoms:lower abd pain.   COMPARISON: None.   ACCESSION NUMBER(S): [id number]   ORDERING CLINICIAN: FERMIN NEGRON   TECHNIQUE: Axial CT images of the abdomen and pelvis with coronal and sagittal reconstructed images obtained after intravenous administration of contrast   FINDINGS: LOWER CHEST: No acute abnormality of the lung bases. BONES: No acute osseous abnormality. Laminectomy and posterior fusion hardware at L3-4. Multilevel degenerative disc changes, severe at L1-2. ABDOMINAL WALL: Within normal limits.   ABDOMEN:   LIVER: Enlarged, 22 cm in craniocaudad length. Diffuse fatty infiltration. Additional focal hypodensities in the left right lobes  measuring up to 1.8 cm, favored to represent cysts. BILE DUCTS: No biliary dilatation. GALLBLADDER: No calcified gallstones. No pericholecystic inflammatory changes. PANCREAS: Within normal limits. SPLEEN: Within normal limits. ADRENALS: Within normal limits. KIDNEYS and URETERS: Symmetric renal enhancement. No hydronephrosis or perinephric fluid collection.     VESSELS: No aortic aneurysm. RETROPERITONEUM: No pathologically enlarged retroperitoneal lymph nodes.   PELVIS:   REPRODUCTIVE ORGANS: Hysterectomy. BLADDER: Within normal limits.   BOWEL: No dilated bowel. Rectosigmoid anastomosis. Colonic diverticulosis without acute diverticulitis. The appendix is not seen. No pericecal inflammatory changes to suggest acute appendicitis. PERITONEUM: No ascites or free air, no fluid collection.       No acute abdominal or pelvic process.   Hepatomegaly and hepatic steatosis.   MACRO: None   Signed by: Melony Fox 10/21/2023 12:41 AM Dictation workstation:   [id number]       Assessment/Plan   Multidrug-resistant UTI  Plan:  Meropenem 1 g IV every 12 hours  ID consultation    Obstructive sleep apnea  Plan:  Resume nocturnal CPAP    Class II obesity BMI 36  Plan:  Lifestyle modification    For chronic medical illnesses  Plan:  Resume home medications when list becomes available    I spent 60 minutes in the professional and overall care of this patient.      Jose Payton DO

## 2023-10-21 NOTE — CARE PLAN
The patient's goals for the shift include pt to remain safe    The clinical goals for the shift include pt to remain stable through

## 2023-10-21 NOTE — PROGRESS NOTES
Patient seen and examined.  Admitted overnight for multidrug-resistant UTI.  Follow-up on urine culture results.  Continue meropenem.  ID consult.  Monitor.

## 2023-10-21 NOTE — ED PROVIDER NOTES
HPI   Chief Complaint   Patient presents with    Urinary Frequency   HPI  Patient is 77-year-old female with complicated medical history that includes COPD, chronic UTIs, hypothyroidism, HTN, HLD, GERD, RA, NARA on CPAP, Alzheimer's the presents to the ED at the referral of PCP for IV antibiotics due to multidrug-resistant UTI.  Patient gets chronic UTIs is on prophylactic nitrofurantoin.     Patient History   History reviewed. No pertinent past medical history.  Past Surgical History:   Procedure Laterality Date    OTHER SURGICAL HISTORY  07/25/2022    Knee surgery    OTHER SURGICAL HISTORY  07/25/2022    Hysterectomy    OTHER SURGICAL HISTORY  07/25/2022    Tonsillectomy    OTHER SURGICAL HISTORY  07/25/2022    Foot surgery    OTHER SURGICAL HISTORY  07/25/2022    Jaw surgery    OTHER SURGICAL HISTORY  07/25/2022    Mid-urethral sling insertion     Family History   Problem Relation Name Age of Onset    Rheum arthritis Mother          with positive rheumatoid factor, involving unspecified site    Stomach cancer Father's Brother       Social History     Tobacco Use    Smoking status: Never    Smokeless tobacco: Never   Substance Use Topics    Alcohol use: Never    Drug use: Never     Physical Exam   ED Triage Vitals [10/20/23 1852]   Temp Heart Rate Resp BP   35.4 °C (95.8 °F) 81 18 (!) 144/96      SpO2 Temp Source Heart Rate Source Patient Position   100 % Oral -- Sitting      BP Location FiO2 (%)     Right arm --       Physical Exam    ED Course & MDM   ED Course as of 10/22/23 1504   Fri Oct 20, 2023   2202 Vitals Reviewed: Afebrile. Hypertensive. Not tachycardic nor tachypneic. No hypoxia.   [KA]   2202 Review of patient's chart shows that she had urine culture on 10/18/2023 that was positive for Klebsiella pneumoniae and is resistant to every medication except ertapenem and meropenem.  Will order dose of IV meropenem along with labs, blood cultures. [KA]   2222 Urinary frequency with q20 min trips to . Lower  abd pain since yesterday. Dull/achy. No radiation. Drinking extra fluid to help avoid UTI. No hematuria. Endorses nausea w/o emesis. No fever, chills, CP. 2d ago non-bloody loose stool. No recent illness. No travel. Saw urologist who told her that probs stem from inability to fully empty bladder, chronic urinary retention. S/p ovarian cyst and hysterectomy. No CKD. No leg swelling. Brother bladder cancer and CKD. Facial swelling with Pcn as a child. Same sulfa.  [KA]   2346 I personally reviewed labs.  CMP shows creatinine similar to baseline.  CBC shows leukocytosis of 17.4.  Will add lactate and initiate sepsis protocol.  Patient meets SIRS criteria of tachycardia, leukocytosis and known source of infection.  Patient did wait in ED waiting room for almost 4 hours prior to being given room or having labs drawn.  Blood cultures and meropenem already started based on urine culture report from 18th.    Patient has history of urinary retention. Postvoid residual bladder scan showed 66 cc urine.  Chose not to insert Kumar catheter at this time. [KA]   Sat Oct 21, 2023   0040 Lactate 2.2.  Spoke with Dr. Delcid, hospitalist who agreed to accept patient to his care.  Admit order.  Will order another bolus of fluids. [KA]   0141 CT imaging negative for any acute findings.  Patient to be given Tylenol.  Reexamination of abdominal reevaluation of the abdominal exam patient is nontender, nonfocal.  Abdomen is soft.  She is continuing to answer questions appropriately. [KA]      ED Course User Index  [KA] Elena Andersen PA-C         Diagnoses as of 10/22/23 1504   Sepsis due to Klebsiella pneumoniae (CMS/[facility])   Newly diagnosed infection due to multidrug resistant organism       Medical Decision Making      Procedure  Procedures     Pan Nicholson, DO  10/22/23 1505

## 2023-10-22 PROBLEM — M06.9 RHEUMATOID ARTHRITIS (MULTI): Status: ACTIVE | Noted: 2023-10-22

## 2023-10-22 PROBLEM — E78.5 HLD (HYPERLIPIDEMIA): Status: ACTIVE | Noted: 2023-10-22

## 2023-10-22 PROBLEM — N39.0 UTI DUE TO KLEBSIELLA SPECIES: Status: ACTIVE | Noted: 2023-10-21

## 2023-10-22 PROBLEM — I10 HTN (HYPERTENSION): Status: ACTIVE | Noted: 2023-10-22

## 2023-10-22 PROBLEM — E03.9 HYPOTHYROIDISM: Status: ACTIVE | Noted: 2023-10-22

## 2023-10-22 PROBLEM — B96.89 UTI DUE TO KLEBSIELLA SPECIES: Status: ACTIVE | Noted: 2023-10-21

## 2023-10-22 PROBLEM — G47.33 OSA (OBSTRUCTIVE SLEEP APNEA): Status: ACTIVE | Noted: 2023-10-22

## 2023-10-22 LAB
ANION GAP SERPL CALC-SCNC: 16 MMOL/L (ref 10–20)
BUN SERPL-MCNC: 18 MG/DL (ref 6–23)
CALCIUM SERPL-MCNC: 8.5 MG/DL (ref 8.6–10.3)
CHLORIDE SERPL-SCNC: 110 MMOL/L (ref 98–107)
CO2 SERPL-SCNC: 20 MMOL/L (ref 21–32)
CREAT SERPL-MCNC: 0.82 MG/DL (ref 0.5–1.05)
ERYTHROCYTE [DISTWIDTH] IN BLOOD BY AUTOMATED COUNT: 14.4 % (ref 11.5–14.5)
GFR SERPL CREATININE-BSD FRML MDRD: 73 ML/MIN/1.73M*2
GLUCOSE SERPL-MCNC: 133 MG/DL (ref 74–99)
HCT VFR BLD AUTO: 42.2 % (ref 36–46)
HGB BLD-MCNC: 13 G/DL (ref 12–16)
MCH RBC QN AUTO: 28.3 PG (ref 26–34)
MCHC RBC AUTO-ENTMCNC: 30.8 G/DL (ref 32–36)
MCV RBC AUTO: 92 FL (ref 80–100)
NRBC BLD-RTO: 0 /100 WBCS (ref 0–0)
PLATELET # BLD AUTO: 280 X10*3/UL (ref 150–450)
PMV BLD AUTO: 10.9 FL (ref 7.5–11.5)
POTASSIUM SERPL-SCNC: 3.6 MMOL/L (ref 3.5–5.3)
RBC # BLD AUTO: 4.6 X10*6/UL (ref 4–5.2)
SODIUM SERPL-SCNC: 142 MMOL/L (ref 136–145)
WBC # BLD AUTO: 11.8 X10*3/UL (ref 4.4–11.3)

## 2023-10-22 PROCEDURE — 2500000002 HC RX 250 W HCPCS SELF ADMINISTERED DRUGS (ALT 637 FOR MEDICARE OP, ALT 636 FOR OP/ED): Performed by: INTERNAL MEDICINE

## 2023-10-22 PROCEDURE — 94640 AIRWAY INHALATION TREATMENT: CPT

## 2023-10-22 PROCEDURE — 3490 HC RX 250 GENERAL PHARMACY W/ HCPCS (ALT 636 FOR OP/ED): Performed by: INTERNAL MEDICINE

## 2023-10-22 PROCEDURE — 2500000001 HC RX 250 WO HCPCS SELF ADMINISTERED DRUGS (ALT 637 FOR MEDICARE OP): Performed by: INTERNAL MEDICINE

## 2023-10-22 PROCEDURE — 80048 BASIC METABOLIC PNL TOTAL CA: CPT | Performed by: INTERNAL MEDICINE

## 2023-10-22 PROCEDURE — 36415 COLL VENOUS BLD VENIPUNCTURE: CPT | Performed by: INTERNAL MEDICINE

## 2023-10-22 PROCEDURE — 2500000004 HC RX 250 GENERAL PHARMACY W/ HCPCS (ALT 636 FOR OP/ED): Performed by: INTERNAL MEDICINE

## 2023-10-22 PROCEDURE — 1100000001 HC PRIVATE ROOM DAILY

## 2023-10-22 PROCEDURE — 85027 COMPLETE CBC AUTOMATED: CPT | Performed by: INTERNAL MEDICINE

## 2023-10-22 PROCEDURE — 99232 SBSQ HOSP IP/OBS MODERATE 35: CPT | Performed by: INTERNAL MEDICINE

## 2023-10-22 RX ADMIN — LORATADINE 10 MG: 10 TABLET ORAL at 09:24

## 2023-10-22 RX ADMIN — FORMOTEROL FUMARATE DIHYDRATE 20 MCG: 20 SOLUTION RESPIRATORY (INHALATION) at 21:50

## 2023-10-22 RX ADMIN — APIXABAN 5 MG: 5 TABLET, FILM COATED ORAL at 20:23

## 2023-10-22 RX ADMIN — Medication 1 G: at 11:02

## 2023-10-22 RX ADMIN — Medication 1000 UNITS: at 09:24

## 2023-10-22 RX ADMIN — LEFLUNOMIDE 20 MG: 10 TABLET ORAL at 09:24

## 2023-10-22 RX ADMIN — BUDESONIDE 0.5 MG: 0.5 INHALANT RESPIRATORY (INHALATION) at 21:50

## 2023-10-22 RX ADMIN — PREGABALIN 75 MG: 75 CAPSULE ORAL at 20:23

## 2023-10-22 RX ADMIN — APIXABAN 5 MG: 5 TABLET, FILM COATED ORAL at 09:24

## 2023-10-22 RX ADMIN — LEVOTHYROXINE SODIUM 150 MCG: 75 TABLET ORAL at 06:38

## 2023-10-22 RX ADMIN — FAMOTIDINE 20 MG: 20 TABLET, FILM COATED ORAL at 09:24

## 2023-10-22 RX ADMIN — NORTRIPTYLINE HYDROCHLORIDE 50 MG: 25 CAPSULE ORAL at 20:23

## 2023-10-22 RX ADMIN — ROSUVASTATIN 10 MG: 10 TABLET, FILM COATED ORAL at 20:23

## 2023-10-22 RX ADMIN — FORMOTEROL FUMARATE DIHYDRATE 20 MCG: 20 SOLUTION RESPIRATORY (INHALATION) at 08:30

## 2023-10-22 RX ADMIN — RIVASTIGMINE TARTRATE 1.5 MG: 1.5 CAPSULE ORAL at 09:32

## 2023-10-22 RX ADMIN — PANTOPRAZOLE SODIUM 40 MG: 40 TABLET, DELAYED RELEASE ORAL at 06:38

## 2023-10-22 RX ADMIN — BUDESONIDE 0.5 MG: 0.5 INHALANT RESPIRATORY (INHALATION) at 08:30

## 2023-10-22 RX ADMIN — TIOTROPIUM BROMIDE INHALATION SPRAY 2 PUFF: 3.12 SPRAY, METERED RESPIRATORY (INHALATION) at 08:32

## 2023-10-22 RX ADMIN — MONTELUKAST 10 MG: 10 TABLET, FILM COATED ORAL at 20:23

## 2023-10-22 RX ADMIN — ESCITALOPRAM OXALATE 20 MG: 20 TABLET ORAL at 09:24

## 2023-10-22 RX ADMIN — RIVASTIGMINE TARTRATE 1.5 MG: 1.5 CAPSULE ORAL at 20:23

## 2023-10-22 ASSESSMENT — PAIN - FUNCTIONAL ASSESSMENT: PAIN_FUNCTIONAL_ASSESSMENT: 0-10

## 2023-10-22 ASSESSMENT — COGNITIVE AND FUNCTIONAL STATUS - GENERAL
DAILY ACTIVITIY SCORE: 24
MOBILITY SCORE: 24

## 2023-10-22 ASSESSMENT — ACTIVITIES OF DAILY LIVING (ADL): LACK_OF_TRANSPORTATION: NO

## 2023-10-22 ASSESSMENT — PAIN SCALES - GENERAL
PAINLEVEL_OUTOF10: 0 - NO PAIN
PAINLEVEL_OUTOF10: 0 - NO PAIN

## 2023-10-22 NOTE — PROGRESS NOTES
"  INFECTIOUS DISEASE DAILY PROGRESS NOTE    SUBJECTIVE:    No overnight events. No new complaints. Afebrile. No rash/itching/diarrhea. Feels better overall.    OBJECTIVE:  VITALS (Last 24 Hours)  /86 (BP Location: Right arm, Patient Position: Lying)   Pulse 94   Temp 36.6 °C (97.9 °F) (Axillary)   Resp 18   Ht 1.651 m (5' 5\")   Wt 99.8 kg (220 lb)   SpO2 95%   BMI 36.61 kg/m²     PHYSICAL EXAM:  Gen - NAD  Abd - soft, no ttp, BS present  Skin - no rash    ABX: IV Ertapenem    LABS:  Lab Results   Component Value Date    WBC 11.8 (H) 10/22/2023    HGB 13.0 10/22/2023    HCT 42.2 10/22/2023    MCV 92 10/22/2023     10/22/2023     Lab Results   Component Value Date    GLUCOSE 133 (H) 10/22/2023    CALCIUM 8.5 (L) 10/22/2023     10/22/2023    K 3.6 10/22/2023    CO2 20 (L) 10/22/2023     (H) 10/22/2023    BUN 18 10/22/2023    CREATININE 0.82 10/22/2023     Results from last 72 hours   Lab Units 10/20/23  2234   ALK PHOS U/L 113   BILIRUBIN TOTAL mg/dL 0.3   PROTEIN TOTAL g/dL 6.3*   ALT U/L 37   AST U/L 36   ALBUMIN g/dL 3.8     Estimated Creatinine Clearance: 66.1 mL/min (by C-G formula based on SCr of 0.82 mg/dL).      ASSESSMENT/PLAN:     UTI due to ESBL Klebsiella - complicated given leukocytosis, resolving now. WBC normalizing.  Penicillin Allergy - no issues with carbapenems thus far.    She is getting better.    Suggest IV Ertapenem today and then tomorrow morning to complete treatment. OK to discharge after this.    If this happens again I would suggest trying PO Fosfomycin 3g once which can be repeated 72H later. Also would call micro lab to check susceptibilities for this in the future if needed.    Will sign off. Please call back with questions. Thanks! D/w LEISA Ingram MD  [facility]  Office #[phone]      "

## 2023-10-22 NOTE — HOSPITAL COURSE
77 yoF with UTI.    Urine with resistant klebsiella. ID has started her on ertapenem. CT abd is unremarkable. She did 3 days of ertapenem. She was feeling better. Of note, BP is elevated, so was started on coreg. Discharged home in stable condition.

## 2023-10-22 NOTE — PROGRESS NOTES
"Kenisha Bergeron is a 77 y.o. female on day 1 of admission presenting with UTI due to Klebsiella species.    Assessment/Plan   77 yoF with UTI.    Urine with resistant klebsiella. ID has started her on ertapenem. CT abd is unremarkable.     Principal Problem:    UTI due to Klebsiella species  Active Problems:    Rheumatoid arthritis (CMS/HCC)    NARA (obstructive sleep apnea)    HTN (hypertension)    Hypothyroidism    HLD (hyperlipidemia)  - erta tomorrow and then dc  - home med regimen          Subjective   Feeling better today.       Objective     Physical Exam  Cardiovascular:      Rate and Rhythm: Normal rate and regular rhythm.      Heart sounds: Normal heart sounds.   Pulmonary:      Breath sounds: Normal breath sounds.   Abdominal:      General: Bowel sounds are normal.      Palpations: Abdomen is soft.   Musculoskeletal:         General: Normal range of motion.   Neurological:      General: No focal deficit present.      Mental Status: She is alert and oriented to person, place, and time.   Psychiatric:         Mood and Affect: Mood normal.         Last Recorded Vitals  Blood pressure 143/86, pulse 94, temperature 36.6 °C (97.9 °F), temperature source Axillary, resp. rate 18, height 1.651 m (5' 5\"), weight 99.8 kg (220 lb), SpO2 95 %.  Intake/Output last 3 Shifts:  I/O last 3 completed shifts:  In: 1100 (11 mL/kg) [IV Piggyback:1100]  Out: - (0 mL/kg)   Weight: 99.8 kg                    I spent 30 minutes in the professional and overall care of this patient.      Sage Sánchez MD      "

## 2023-10-23 VITALS
DIASTOLIC BLOOD PRESSURE: 72 MMHG | BODY MASS INDEX: 36.65 KG/M2 | TEMPERATURE: 98 F | RESPIRATION RATE: 18 BRPM | SYSTOLIC BLOOD PRESSURE: 136 MMHG | HEART RATE: 90 BPM | WEIGHT: 220 LBS | OXYGEN SATURATION: 94 % | HEIGHT: 65 IN

## 2023-10-23 LAB — BACTERIA UR CULT: ABNORMAL

## 2023-10-23 PROCEDURE — 99239 HOSP IP/OBS DSCHRG MGMT >30: CPT | Performed by: INTERNAL MEDICINE

## 2023-10-23 PROCEDURE — 2500000004 HC RX 250 GENERAL PHARMACY W/ HCPCS (ALT 636 FOR OP/ED): Performed by: INTERNAL MEDICINE

## 2023-10-23 PROCEDURE — 2500000001 HC RX 250 WO HCPCS SELF ADMINISTERED DRUGS (ALT 637 FOR MEDICARE OP): Performed by: INTERNAL MEDICINE

## 2023-10-23 PROCEDURE — 3490 HC RX 250 GENERAL PHARMACY W/ HCPCS (ALT 636 FOR OP/ED): Performed by: INTERNAL MEDICINE

## 2023-10-23 RX ORDER — CARVEDILOL 6.25 MG/1
6.25 TABLET ORAL 2 TIMES DAILY
Status: DISCONTINUED | OUTPATIENT
Start: 2023-10-23 | End: 2023-10-23 | Stop reason: HOSPADM

## 2023-10-23 RX ORDER — CARVEDILOL 6.25 MG/1
6.25 TABLET ORAL 2 TIMES DAILY
Qty: 60 TABLET | Refills: 0 | Status: SHIPPED | OUTPATIENT
Start: 2023-10-23 | End: 2023-11-22

## 2023-10-23 RX ORDER — HYDRALAZINE HYDROCHLORIDE 20 MG/ML
5 INJECTION INTRAMUSCULAR; INTRAVENOUS ONCE
Status: DISCONTINUED | OUTPATIENT
Start: 2023-10-23 | End: 2023-10-23 | Stop reason: HOSPADM

## 2023-10-23 RX ORDER — HYDRALAZINE HYDROCHLORIDE 25 MG/1
25 TABLET, FILM COATED ORAL ONCE
Status: COMPLETED | OUTPATIENT
Start: 2023-10-23 | End: 2023-10-23

## 2023-10-23 RX ADMIN — LEFLUNOMIDE 20 MG: 10 TABLET ORAL at 09:24

## 2023-10-23 RX ADMIN — Medication 1000 UNITS: at 09:24

## 2023-10-23 RX ADMIN — LORATADINE 10 MG: 10 TABLET ORAL at 09:24

## 2023-10-23 RX ADMIN — APIXABAN 5 MG: 5 TABLET, FILM COATED ORAL at 09:24

## 2023-10-23 RX ADMIN — Medication 1 G: at 09:24

## 2023-10-23 RX ADMIN — HYDRALAZINE HYDROCHLORIDE 25 MG: 25 TABLET ORAL at 12:01

## 2023-10-23 RX ADMIN — PANTOPRAZOLE SODIUM 40 MG: 40 TABLET, DELAYED RELEASE ORAL at 06:01

## 2023-10-23 RX ADMIN — CARVEDILOL 6.25 MG: 6.25 TABLET, FILM COATED ORAL at 12:02

## 2023-10-23 RX ADMIN — ESCITALOPRAM OXALATE 20 MG: 20 TABLET ORAL at 09:24

## 2023-10-23 RX ADMIN — FAMOTIDINE 20 MG: 20 TABLET, FILM COATED ORAL at 09:24

## 2023-10-23 RX ADMIN — LEVOTHYROXINE SODIUM 150 MCG: 75 TABLET ORAL at 06:01

## 2023-10-23 RX ADMIN — RIVASTIGMINE TARTRATE 1.5 MG: 1.5 CAPSULE ORAL at 09:24

## 2023-10-23 ASSESSMENT — COGNITIVE AND FUNCTIONAL STATUS - GENERAL
MOBILITY SCORE: 24
DAILY ACTIVITIY SCORE: 24
MOBILITY SCORE: 24
DAILY ACTIVITIY SCORE: 24

## 2023-10-23 ASSESSMENT — PAIN SCALES - GENERAL: PAINLEVEL_OUTOF10: 0 - NO PAIN

## 2023-10-23 NOTE — PROGRESS NOTES
10/23/23 1241   Discharge Planning   Living Arrangements Alone  ([facility])   Support Systems Spouse/significant other;Children;Other (Comment)  (Facility Resources)   Type of Residence Private residence   Number of Stairs to Enter Residence 0   Number of Stairs Within Residence   (Elevator)   Who is requesting discharge planning? Other (Comment)  (TCC Initial Assessment)   Home or Post Acute Services None   Patient expects to be discharged to: Home   Does the patient need discharge transport arranged? No  (Family to transport)     TCC met with patient/family at bedside. Introduced self and role to patient/ family. Patient endorses to be independent in ADLS/ IADLS.  She lives alone in apartment at [facility].   She is not active with Home Care services. She is ambulatory with no use of assistive devices.      She denies use of home oxygen, no Hemodialysis and no DME. Patient's family drives her to medical appointments.  Denies issues with obtaining medical prescriptions.  No skilled needs identified at this time. TCC remains available for discharge needs.   Alicja DHALIWAL RN TCC CCM

## 2023-10-23 NOTE — CARE PLAN
Problem: Fall/Injury  Goal: Verbalize understanding of personal risk factors for fall in the hospital  Outcome: Progressing  Goal: Verbalize understanding of risk factor reduction measures to prevent injury from fall in the home  Outcome: Progressing  Goal: Use assistive devices by end of the shift  Outcome: Progressing  Goal: Pace activities to prevent fatigue by end of the shift  Outcome: Progressing     Problem: Fall/Injury  Goal: Not fall by end of shift  Outcome: Met  Goal: Be free from injury by end of the shift  Outcome: Met

## 2023-10-24 ENCOUNTER — LAB (OUTPATIENT)
Dept: LAB | Facility: LAB | Age: 78
End: 2023-10-24
Payer: MEDICARE

## 2023-10-24 DIAGNOSIS — N39.0 URINARY TRACT INFECTION, SITE NOT SPECIFIED: Primary | ICD-10-CM

## 2023-10-24 LAB
APPEARANCE UR: CLEAR
BILIRUB UR STRIP.AUTO-MCNC: NEGATIVE MG/DL
COLOR UR: NORMAL
GLUCOSE UR STRIP.AUTO-MCNC: NEGATIVE MG/DL
KETONES UR STRIP.AUTO-MCNC: NEGATIVE MG/DL
LEUKOCYTE ESTERASE UR QL STRIP.AUTO: NEGATIVE
NITRITE UR QL STRIP.AUTO: NEGATIVE
PH UR STRIP.AUTO: 5 [PH]
PROT UR STRIP.AUTO-MCNC: NEGATIVE MG/DL
RBC # UR STRIP.AUTO: NEGATIVE /UL
SP GR UR STRIP.AUTO: 1.01
UROBILINOGEN UR STRIP.AUTO-MCNC: <2 MG/DL

## 2023-10-24 PROCEDURE — 87086 URINE CULTURE/COLONY COUNT: CPT

## 2023-10-24 PROCEDURE — 81003 URINALYSIS AUTO W/O SCOPE: CPT

## 2023-10-24 NOTE — DISCHARGE SUMMARY
"Admitting Provider: Jose Payton DO  Discharge Provider: Sage Sánchez MD  Primary Care Physician at Discharge: No primary care provider on file. None   Admission Date: 10/20/2023     Discharge Date: 10/23/2023  Current Planned Discharge Disposition:      Discharge Diagnoses  Principal Problem:    UTI due to Klebsiella species  Active Problems:    Rheumatoid arthritis (CMS/HCC)    NARA (obstructive sleep apnea)    HTN (hypertension)    Hypothyroidism    HLD (hyperlipidemia)      Hospital Course  77 yoF with UTI.    Urine with resistant klebsiella. ID has started her on ertapenem. CT abd is unremarkable. She did 3 days of ertapenem. She was feeling better. Of note, BP is elevated, so was started on coreg. Discharged home in stable condition.    Test Results Pending At Discharge  Pending Labs       Order Current Status    Blood Culture Preliminary result    Blood Culture Preliminary result            Pertinent Physical Exam At Time of Discharge  /72 Comment: md aware ok to discharge  Pulse 90   Temp 36.7 °C (98 °F)   Resp 18   Ht 1.651 m (5' 5\")   Wt 99.8 kg (220 lb)   SpO2 94%   BMI 36.61 kg/m²   Physical Exam  Cardiovascular:      Rate and Rhythm: Normal rate and regular rhythm.      Heart sounds: Normal heart sounds.   Pulmonary:      Breath sounds: Normal breath sounds.   Abdominal:      General: Bowel sounds are normal.      Palpations: Abdomen is soft.   Musculoskeletal:         General: Normal range of motion.   Neurological:      General: No focal deficit present.      Mental Status: She is alert and oriented to person, place, and time.   Psychiatric:         Mood and Affect: Mood normal.         Home Medications     Medication List      START taking these medications     carvedilol 6.25 mg tablet; Commonly known as: Coreg; Take 1 tablet (6.25   mg) by mouth 2 times a day.     CONTINUE taking these medications     Arava 20 mg tablet; Generic drug: leflunomide   cholecalciferol 25 MCG (1000 " UT) capsule; Commonly known as: Vitamin D-3   Eliquis 5 mg tablet; Generic drug: apixaban   Estring 2 mg (7.5 mcg /24 hour) vaginal ring; Generic drug: estradiol   famotidine 20 mg tablet; Commonly known as: Pepcid   fluconazole 150 mg tablet; Commonly known as: Diflucan   levothyroxine 150 mcg tablet; Commonly known as: Synthroid, Levoxyl   Lexapro 20 mg tablet; Generic drug: escitalopram   Lyrica 75 mg capsule; Generic drug: pregabalin   NexIUM Packet 40 mg packet; Generic drug: esomeprazole   nortriptyline 50 mg capsule; Commonly known as: Pamelor   rivastigmine 1.5 mg capsule; Commonly known as: Exelon   rosuvastatin 5 mg tablet; Commonly known as: Crestor   Singulair 10 mg tablet; Generic drug: montelukast   Trelegy Ellipta 200-62.5-25 mcg blister with device; Generic drug:   fluticasone-umeclidin-vilanter   Ventolin HFA 90 mcg/actuation inhaler; Generic drug: albuterol   Wixela Inhub 250-50 mcg/dose diskus inhaler; Generic drug: fluticasone   propion-salmeteroL     STOP taking these medications     cetirizine 10 mg tablet; Commonly known as: ZyrTEC   furosemide 10 mg/mL solution; Commonly known as: Lasix   nitrofurantoin (macrocrystal-monohydrate) 100 mg capsule; Commonly known   as: Macrobid   nitrofurantoin 50 mg capsule; Commonly known as: Macrodantin       Outpatient Follow-Up  Future Appointments   Date Time Provider Department [place]   11/1/2023 11:45 AM Sylvie Sun [facility]   11/27/2023 11:50 AM Trevin Pacheco MD [id number] [facility]       Sage Sánchez MD    I spent more than 30 min coordinating this patient's discharge.

## 2023-10-25 LAB
BACTERIA BLD CULT: NORMAL
BACTERIA BLD CULT: NORMAL
BACTERIA UR CULT: NO GROWTH

## 2023-10-31 NOTE — DOCUMENTATION CLARIFICATION NOTE
"    PATIENT:               IRMA HAYWOOD  ACCT #:                  [id number]  MRN:                       [id number]  :                       1945  ADMIT DATE:       10/20/2023 9:12 PM  DISCH DATE:        10/23/2023 1:16 PM  RESPONDING PROVIDER #:        [id number]          PROVIDER RESPONSE TEXT:    Sepsis was a differential diagnosis and ruled out after study    CDI QUERY TEXT:    [facility]_CV Sepsis    Based on your assessment of the patient and the clinical information, please provide the requested documentation by clicking on the appropriate radio button and enter any additional information if prompted.    Question: Sepsis was documented in the medical record. Based on the documentation and the clinical information, can the diagnosis be further clarified as      When answering this query, please exercise your independent professional judgment. The fact that a question is being asked, does not imply that any particular answer is desired or expected.    The patient's clinical indicators include:  Clinical Information: 76 y/o female admitted 10/20- 10/23, treated for multi-drug resistant UTI.    Documented Diagnosis:  \"Sepsis due Klebsiella pneumoniae\" is noted on 10/20 ED note (Elena Andersen PA-C). Sepsis not noted again or documented to be ruled out.    Clinical Indicators  -VS on admit: T 95.8, HR 81, RR 18, /96, SpO2 100%  -WBC:  17.4, 11.8  -Platelets:  280, 322  -Neutrophils Absolute:  10.44  -BUN:  18, 25  -Creat:  0.82, 1.10  -Bilirubin:  0.3  -Lactic acid:  2.2, 1.9  -Procalcitonin:  n/a  -10/20 Blood cultures:  no growth  -10/20 Urine culture: Klebsiella pneumoniae/variicola  -Other clinical indicators: No Linked organ dysfunction    Treatment: IV NS bolus x1, IV Meropenem 1g x1 on 10/20, IV Ertapenem 1g x3 10/21- 10/23    Risk Factors: chronic UTIs  Options provided:  -- Sepsis was a differential diagnosis and ruled out after study  -- Sepsis with acute organ dysfunction, Please specify organ " dysfunction below  -- Other - I will add my own diagnosis  -- Refer to Clinical Documentation Reviewer    Query created by: Valeria Patel on 10/25/2023 4:02 PM      Electronically signed by:  JUVENTINO MONTOYA MD 10/31/2023 8:20 AM

## 2023-11-01 ENCOUNTER — ALLIED HEALTH (OUTPATIENT)
Dept: INTEGRATIVE MEDICINE | Facility: CLINIC | Age: 78
End: 2023-11-01
Payer: MEDICARE

## 2023-11-01 DIAGNOSIS — M54.59 OTHER LOW BACK PAIN: Primary | ICD-10-CM

## 2023-11-01 PROCEDURE — 97811 ACUP 1/> W/O ESTIM EA ADD 15: CPT | Performed by: ACUPUNCTURIST

## 2023-11-01 PROCEDURE — 97810 ACUP 1/> WO ESTIM 1ST 15 MIN: CPT | Performed by: ACUPUNCTURIST

## 2023-11-01 NOTE — PROGRESS NOTES
"Patient reports back pain has decreased, overall feeling better since her last acu tx. \"It went well.\"     Previous:  Immediate pt reported benefits of decrease in pain lasted 2 days in duration.  Since last tx pt reports symptoms of low back spreading from left to right hip medium  in intensity and constant in  frequency.   Pt also observed the following changes that pain stops when at rest.  She rests for about 1 hour.  She is able to do some house based activity she can go for awhile.  If activity includes bending she has to rest after 30 min.      Acupuncture Visit:     Subjective   Patient ID: Kenisha Bergeron is a 77 y.o. female who presents for No chief complaint on file.    HPI  Session Information  Is this acupuncture treatment being billed to the patient's insurance company: Yes  This is visit number: 3  The patient has a total number of visits of: 12  Initial Acupuncture Treatment date: 09/13/23  Name of Insurance Company: Medicare A/B  Name of Supervising Physician: ELSIE Strange  Visit Type: Follow-up visit         Review of Systems         Provider reviewed plan for the acupuncture session, precautions and contraindications. Patient/guardian/hospital staff has given consent to treat with full understanding of what to expect during the session. Before acupuncture began, provider explained to the patient to communicate at any time if the procedure was causing discomfort past their tolerance level. Patient agreed to advise acupuncturist. The acupuncturist counseled the patient on the risks of acupuncture treatment including pain, infection, bleeding, and no relief of pain. The patient was positioned comfortably. There was no evidence of infection at the site of needle insertions.    Objective   Physical Exam         Treatment Plan  Treatment Goals:  (decrease the intensity duration and frequency of pain)  Pattern Differentiation: st qi deficiency, oketsu, adrenal imbalance  Treatment Principle: move qi and " blood regulate adrenals    Acupuncture Treatment  Patient Position: Supine on a table  Acupuncture Needling: Yes  Needle Guage: 40 guage /.16/ Red seirin  Body Points: With retention  Body Points - Left: k 7, 10  Body Points - Bilateral: gb 41,lr, 3, sj 5, li4, st qi x2  Body Points - Right: k 3, 9  Auricular Points: No  Electroacupuncture Used: No  Other Techniques Utilized: Topicals  Topicals Description: orange peppermint with spirit quieting oil  Needle Count In: 16  Needle Count Out: 16  Needle Retention Time (min): 25 minutes  Total Face to Face Time (min): 45 minutes              Assessment/Plan

## 2023-11-10 ENCOUNTER — OFFICE VISIT (OUTPATIENT)
Dept: UROLOGY | Facility: CLINIC | Age: 78
End: 2023-11-10
Payer: MEDICARE

## 2023-11-10 VITALS
BODY MASS INDEX: 41.34 KG/M2 | HEART RATE: 102 BPM | DIASTOLIC BLOOD PRESSURE: 89 MMHG | SYSTOLIC BLOOD PRESSURE: 135 MMHG | WEIGHT: 248.4 LBS

## 2023-11-10 DIAGNOSIS — N39.41 URGE INCONTINENCE: ICD-10-CM

## 2023-11-10 DIAGNOSIS — N39.3 FEMALE STRESS INCONTINENCE: ICD-10-CM

## 2023-11-10 DIAGNOSIS — N81.89 PELVIC FLOOR WEAKNESS: ICD-10-CM

## 2023-11-10 DIAGNOSIS — N95.2 ATROPHIC VAGINITIS: ICD-10-CM

## 2023-11-10 DIAGNOSIS — N39.0 CHRONIC UTI: Primary | ICD-10-CM

## 2023-11-10 LAB
POC APPEARANCE, URINE: CLEAR
POC BILIRUBIN, URINE: NEGATIVE
POC BLOOD, URINE: NEGATIVE
POC COLOR, URINE: YELLOW
POC GLUCOSE, URINE: NEGATIVE MG/DL
POC KETONES, URINE: NEGATIVE MG/DL
POC LEUKOCYTES, URINE: ABNORMAL
POC NITRITE,URINE: NEGATIVE
POC PH, URINE: 5 PH
POC PROTEIN, URINE: NEGATIVE MG/DL
POC SPECIFIC GRAVITY, URINE: 1.01
POC UROBILINOGEN, URINE: 0.2 EU/DL

## 2023-11-10 PROCEDURE — 3075F SYST BP GE 130 - 139MM HG: CPT | Performed by: OBSTETRICS & GYNECOLOGY

## 2023-11-10 PROCEDURE — 1111F DSCHRG MED/CURRENT MED MERGE: CPT | Performed by: OBSTETRICS & GYNECOLOGY

## 2023-11-10 PROCEDURE — 81003 URINALYSIS AUTO W/O SCOPE: CPT | Performed by: OBSTETRICS & GYNECOLOGY

## 2023-11-10 PROCEDURE — 99214 OFFICE O/P EST MOD 30 MIN: CPT | Performed by: OBSTETRICS & GYNECOLOGY

## 2023-11-10 PROCEDURE — 1036F TOBACCO NON-USER: CPT | Performed by: OBSTETRICS & GYNECOLOGY

## 2023-11-10 PROCEDURE — 87186 SC STD MICRODIL/AGAR DIL: CPT | Performed by: OBSTETRICS & GYNECOLOGY

## 2023-11-10 PROCEDURE — 3079F DIAST BP 80-89 MM HG: CPT | Performed by: OBSTETRICS & GYNECOLOGY

## 2023-11-10 PROCEDURE — 87086 URINE CULTURE/COLONY COUNT: CPT | Performed by: OBSTETRICS & GYNECOLOGY

## 2023-11-10 PROCEDURE — 1126F AMNT PAIN NOTED NONE PRSNT: CPT | Performed by: OBSTETRICS & GYNECOLOGY

## 2023-11-10 PROCEDURE — 1159F MED LIST DOCD IN RCRD: CPT | Performed by: OBSTETRICS & GYNECOLOGY

## 2023-11-10 RX ORDER — METHENAMINE HIPPURATE 1000 MG/1
1 TABLET ORAL 2 TIMES DAILY
Qty: 60 TABLET | Refills: 2 | Status: SHIPPED | OUTPATIENT
Start: 2023-11-10 | End: 2024-02-08

## 2023-11-10 RX ORDER — ESTRADIOL 2 MG/1
SYSTEM VAGINAL
Qty: 1 EACH | Refills: 4 | Status: SHIPPED | OUTPATIENT
Start: 2023-11-10

## 2023-11-10 NOTE — PROGRESS NOTES
Subjective   Patient ID: Kenisha Bergeron is a 78 y.o. female who presents for follow up.  HPI   78-year-old with mixed urinary incontinence, urge predominant, history of mid urethral sling, pelvic floor weakness, and vaginal atrophy presenting following 10/24/2022 100 units Botox with recurrent urinary tract infections and significant improvements in lower urinary tract complaints following PTNS therapy.    The patient was last seen in May. She has since been utilizing the low dose Macrodantin and cranberry juice.  She also has the Estring in place.  The patients daughter reports she went for  tooth extraction but could note proceed with it  due to her UTI, she denies any UTI like symptoms however she felt low on energy and pressure. This was followed by a hospitalization in October due to  multidrug-resistant UTI ESBL Klebsiella. She was started on IV Ertapenem.  She states at that time she had minimal effects from her UTI.    She is noting worsening the urinary urgency and frequency. She completed her 12 sessions of PTNS but has not been able to  continue it once every 4 weeks.      She denies any vaginal complaints, no abnormal bleeding or discharge.     She denies any bowel related complaints, no fecal or flatal incontinence.    She has no other complaints.    From Previous note  77-year-old with mixed urinary incontinence, urge predominant, history of mid urethral sling, pelvic floor weakness, and vaginal atrophy having undergone 10/24/2022 100 units Botox and PTNS therapy and recent UTI.     The patient underwent 12 sessions of PTNS therapy and noticed great improvement in her lower urinary tract symptoms, she is noting decreased urinary frequency, nocturia and urinary incontinence complaints. She will continue on maintenance therapy. She received her Estring but has not had the opportunity to place this. She is taking her low dose Macrodantin for low-dose prophylaxis for urinary infections. She had a UTI in  February, culture demonstrated Klebsiella pneumoniae which was resistant to nitrofurantoin. She denies any UTI like symptoms.     She denies any vaginal complaints. She denies any abnormal vaginal bleeding or discharge. She denies any prolapse complaints. She is not sexually active.     She denies any bowel related complaints including fecal or flatal incontinence.     She has no new complains.     From previous note  This visit was performed through telemedicine  77-year-old with mixed urinary incontinence, urge predominant, history of mid urethral sling, pelvic floor weakness, and vaginal atrophy, following 10/24/2022 for 100 units Botox, presenting virtually today for follow up on lower urinary symptoms.     She has received her Estring but has not had the opportunity to place this. She is taking her low dose Macrodantin for low-dose prophylaxis for urinary infections. She states her urinary urgency and frequency symptoms are still present. Daytime urination 6 times daily, and nocturia 2x. No leakage between urinations but wears a pad daily. She is interested in PTNS. She feels her UTI symptoms have resolved.     She denies any vaginal complaints. She denies any abnormal vaginal bleeding or discharge. She denies any prolapse complaints. She is not sexually active.     She denies any bowel related complaints including fecal or flatal incontinence.     She has no new complains.        From previous note  77-year-old with mixed urinary incontinence, urge predominant, history of mid urethral sling, pelvic floor weakness, and vaginal atrophy having undergone 10/24/2022 100 units Botox .     The patient states that she has been experiencing UTI like symptoms proven by most recent culture. She has been taking ciprofloxacin.      She is not comfortable with using the vaginal estrogen cream and hence would like to use Estring.      She denies any vaginal complaints. She denies any abnormal vaginal bleeding or discharge.  "She denies any prolapse complaints. She is not sexually active.     She denies any bowel related complaints including fecal or flatal incontinence.        From previous note  76-year-old with mixed urinary incontinence, urge predominant, history of mid urethral sling, pelvic floor weakness, and vaginal atrophy presenting today for 100 units of Detrusor Botox.     She has no new complains.           From previous note  This visit was performed via telemedicine.   76-year-old with mixed urinary incontinence, urge predominant, history of mid urethral sling, urinalysis positive for nitrites, pelvic floor weakness, and vaginal atrophy presenting today to discuss the results of her UDS.     The patient has had multiple procedures performed on her bladder in the past. She underwent a hysterectomy with bilateral oophorectomy many years ago due to \"hemorrhaging\". She underwent a sling, most likely a mid urethral retropubic, roughly 15 to 20 years ago. She notes 2 episodes of nocturia. She does note rare stress urinary incontinence but this is much less bothersome to her. She has previously undergone Botox x 2 and while the first Botox did help, the second was complicated by retention after they \"increase the dose\".     She denies any vaginal complaints. She denies any abnormal vaginal bleeding or discharge. She denies any prolapse complaints. She is not sexually active.     She denies any bowel related complaints including fecal or flatal incontinence.        From previous note  76-year-old presenting as a self-referral with complaints of urinary incontinence.     The patient has had multiple procedures performed on her bladder in the past. She underwent a hysterectomy with bilateral oophorectomy many years ago due to \"hemorrhaging\". She underwent a sling, most likely a mid urethral retropubic, roughly 15 to 20 years ago. This was complicated by what sounds like incomplete healing and obstruction requiring a sling lysis or " excision. She has also undergone Botox x2 and while the first Botox did help, the second was complicated by retention. She believes this occurred roughly 15 years ago. She did follow-up with a specialist roughly 1 year ago and is utilizing Myrbetriq with minimal benefits. She did have a cystoscopy at that time per her report which was negative.     She notes significant daytime urgency roughly 2 times per hour. She notes nightly enuresis as well as nocturia. She does note rare stress urinary incontinence but this is much less bothersome to her. She otherwise denies a history of nephrolithiasis, chronic urinary tract infections, or any gross hematuria.     She denies any vaginal complaints. She denies any abnormal vaginal bleeding or discharge. She denies any prolapse complaints. She is not sexually active.     She denies any bowel related complaints including fecal or flatal incontinence.     Urinalysis is positive for nitrites.     She has no other complaints.   Review of Systems  Constitutional: No fever, No chills and No fatigue.   Eyes: No vision problems and No dryness of the eyes.   ENT: No dry mouth, No hearing loss and No nosebleeds.   Cardiovascular: No chest pain, No palpitations and No orthopnea.   Respiratory: No shortness of breath, No cough and No wheezing.   Gastrointestinal: No abdominal pain, No constipation, No nausea, No diarrhea, No vomiting and No melena.   Genitourinary: As noted in HPI.   Musculoskeletal: No back pain, No myalgias, No muscle weakness, No joint swelling and No leg edema.   Integumentary: No rashes, No skin lesion and No itching.   Neurological: No headache, No numbness and No dizziness.   Psychiatric: No sleep disturbances, No anxiety and No depression.   Endocrine: No hot flashes, No loss of hair and No hirsutism.   Hematologic/Lymphatic: No swollen glands, No tendency for easy bleeding and No tendency for easy bruising.   All other systems have been reviewed and are negative  for complaint.        Objective   Physical Exam    PHYSICAL EXAMINATION:  No LMP recorded.  Body mass index is 41.34 kg/m².  /89   Pulse 102   Wt 113 kg (248 lb 6.4 oz)   BMI 41.34 kg/m²   General Appearance: well appearing  Neuro: Alert and oriented   HEENT: mucous membranes moist, neck supple  Resp: No respiratory distress, normal work of breathing  MSK: normal range of motion, gait appropriate    Pelvic:    No changes to the patient's pelvic exam.  Significant vaginal atrophy.  Estring placed without difficulty.      Assessment/Plan      78-year-old with mixed urinary incontinence, urge predominant, history of mid urethral sling, pelvic floor weakness, and vaginal atrophy presenting following 10/24/2022 100 units Botox with recurrent urinary tract infections and significant improvements in lower urinary tract complaints following PTNS therapy.     #1 patient appears to be doing quite well utilizing PTNS therapy. She previously had only moderate improvement with Botox therapy complicated by recurrent infections. She has previously undergone a mid urethral sling with excision due to either erosion or obstruction. She has also undergone intra detrusor Botox twice, with improvements with the first injection but with retention noted after increasing this dose with a second.  She has previously utilized anticholinergics and Myrbetriq therapy.  We did discuss the possibility of a trial for Gemtesa.  We discussed stopping this medication immediately should she have any bothersome side effects and she will be started on this now. We have previously discussed the findings of her urodynamics demonstrating hypersensitivity as well as genuine stress urinary incontinence. She is most bothered by her urgency complaints. We again discussed timed voiding and fluid management techniques. We have previously also discussed Interstim therapy. She has had excellent results with her PTNS.  We discussed getting rescheduled at  her earliest convenience.     #2 we discussed at length today her her current urinary tract infections. We discussed the importance of utilizing a vaginal estrogen product. She has had difficulty in utilizing vaginal estrogen cream and has utilized the Estring device previously.  This was placed today 11/10/2023 and we discussed follow-up in 3 months for replacement.  A new prescription was sent into her pharmacy. We also discussed not utilizing any vaginal irritants. The patient will continue her cranberry extract tablets and d-mannose therapy.  She will start methenamine therapy now.  Her kidney function is excellent.  We discussed prophylactic antibiotics and the patient will continue her Macrodantin therapy.  She did have multidrug-resistant Klebsiella 3 weeks ago.  Pending treatment of cure.  She was provided a new standing order for urinalysis and urine culture.      3. The patient will follow-up for PTNS maintenance therapy at her earliest convenience.  She will follow-up in 4 to 6 weeks to discuss her Gemtesa therapy.  She will require her Estring replacement in February 2024.     ELSIE Pacheco MD    Scribe Attestation  By signing my name below, IYari Scribe attest that this documentation has been prepared under the direction and in the presence of Trevin Pacheco MD. All medical record entries made by the Scribe were at my direction or personally dictated by me. I have reviewed the chart and agree that the record accurately reflects my personal performance of the history, physical exam, discussion and plan.

## 2023-11-10 NOTE — PATIENT INSTRUCTIONS
Please continue your cranberry extract tablets and d-mannose therapy.  This can be picked up to your local health food store or pharmacy.    You have been prescribed methenamine.  This is a pill in the morning in the evening and this will help reduce the risk of urinary tract infections.      You have been provided a standing order for urinalysis and urine culture.  Please present immediately to any [facility] lab with any UTI symptoms.    Please continue your vaginal Estring.  This should be replaced in February 2024.    You have been started on Gemtesa therapy.  This should help with urgency and frequency.  Please stop this medication should you have any complaints.    Please continue follow-up every 6 weeks for PTNS therapy.    Please follow-up virtually in 6 weeks to discuss her lower urinary tract symptoms.    Please contact the clinic with any questions or concerns    [phone].

## 2023-11-12 LAB
BACTERIA UR CULT: ABNORMAL
BACTERIA UR CULT: ABNORMAL

## 2023-11-13 DIAGNOSIS — N39.0 ACUTE UTI: Primary | ICD-10-CM

## 2023-11-13 DIAGNOSIS — N39.0 CHRONIC UTI: Primary | ICD-10-CM

## 2023-11-13 RX ORDER — GABAPENTIN 300 MG/1
100 CAPSULE ORAL
COMMUNITY
Start: 2017-07-09

## 2023-11-13 RX ORDER — ATORVASTATIN CALCIUM 20 MG/1
20 TABLET, FILM COATED ORAL
COMMUNITY

## 2023-11-13 RX ORDER — IBUPROFEN 600 MG/1
TABLET ORAL
COMMUNITY
Start: 2023-09-12

## 2023-11-13 RX ORDER — FUROSEMIDE 40 MG/1
40 TABLET ORAL
COMMUNITY
Start: 2023-09-20

## 2023-11-13 RX ORDER — NITROFURANTOIN 25; 75 MG/1; MG/1
100 CAPSULE ORAL 2 TIMES DAILY
Qty: 14 CAPSULE | Refills: 0 | Status: SHIPPED | OUTPATIENT
Start: 2023-11-13 | End: 2023-11-20

## 2023-11-13 RX ORDER — CETIRIZINE HYDROCHLORIDE 10 MG/1
10 TABLET ORAL NIGHTLY
COMMUNITY
Start: 2023-11-01

## 2023-11-13 RX ORDER — ERGOCALCIFEROL 1.25 MG/1
1 CAPSULE ORAL
COMMUNITY

## 2023-11-13 RX ORDER — PANTOPRAZOLE SODIUM 40 MG/1
40 TABLET, DELAYED RELEASE ORAL
COMMUNITY

## 2023-11-13 RX ORDER — SYRINGE WITH NEEDLE, 1 ML 25GX5/8"
SYRINGE, EMPTY DISPOSABLE MISCELLANEOUS
COMMUNITY
Start: 2023-09-14

## 2023-11-13 RX ORDER — CYANOCOBALAMIN 1000 UG/ML
INJECTION, SOLUTION INTRAMUSCULAR; SUBCUTANEOUS
COMMUNITY
Start: 2023-09-14

## 2023-11-14 DIAGNOSIS — N39.0 CHRONIC UTI: Primary | ICD-10-CM

## 2023-11-14 RX ORDER — NITROFURANTOIN MACROCRYSTALS 50 MG/1
CAPSULE ORAL
Qty: 90 CAPSULE | Refills: 3 | Status: SHIPPED | OUTPATIENT
Start: 2023-11-14

## 2023-11-16 RX ORDER — NITROFURANTOIN MACROCRYSTALS 50 MG/1
CAPSULE ORAL
Qty: 90 CAPSULE | Refills: 3 | Status: SHIPPED | OUTPATIENT
Start: 2023-11-16

## 2023-11-16 RX ORDER — METOPROLOL SUCCINATE 25 MG/1
TABLET, EXTENDED RELEASE ORAL
COMMUNITY
Start: 2023-11-13

## 2023-11-19 DIAGNOSIS — I10 PRIMARY HYPERTENSION: ICD-10-CM

## 2023-11-27 ENCOUNTER — APPOINTMENT (OUTPATIENT)
Dept: UROLOGY | Facility: CLINIC | Age: 78
End: 2023-11-27
Payer: MEDICARE

## 2023-11-27 RX ORDER — CARVEDILOL 6.25 MG/1
TABLET ORAL
Qty: 60 TABLET | Refills: 0 | OUTPATIENT
Start: 2023-11-27

## 2023-12-06 ENCOUNTER — LAB (OUTPATIENT)
Dept: LAB | Facility: LAB | Age: 78
End: 2023-12-06
Payer: MEDICARE

## 2023-12-06 DIAGNOSIS — N39.0 CHRONIC UTI: Primary | ICD-10-CM

## 2023-12-06 DIAGNOSIS — N39.0 CHRONIC UTI: ICD-10-CM

## 2023-12-06 PROCEDURE — 87086 URINE CULTURE/COLONY COUNT: CPT

## 2023-12-08 LAB — BACTERIA UR CULT: NORMAL

## 2023-12-13 ENCOUNTER — ALLIED HEALTH (OUTPATIENT)
Dept: INTEGRATIVE MEDICINE | Facility: CLINIC | Age: 78
End: 2023-12-13
Payer: MEDICARE

## 2023-12-13 DIAGNOSIS — M54.59 OTHER LOW BACK PAIN: Primary | ICD-10-CM

## 2023-12-13 PROCEDURE — 97811 ACUP 1/> W/O ESTIM EA ADD 15: CPT | Performed by: ACUPUNCTURIST

## 2023-12-13 PROCEDURE — 97810 ACUP 1/> WO ESTIM 1ST 15 MIN: CPT | Performed by: ACUPUNCTURIST

## 2023-12-13 NOTE — PROGRESS NOTES
"Patient reports back pain has decreased, overall feeling better since her last acu tx. \"It went well.\"     Previous:  Immediate pt reported benefits of decrease in pain lasted 2 days in duration.  Since last tx pt reports symptoms of low back spreading from left to right hip medium  in intensity and constant in  frequency.   Pt also observed the following changes that pain stops when at rest.  She rests for about 1 hour.  She is able to do some house based activity she can go for awhile.  If activity includes bending she has to rest after 30 min.      Acupuncture Visit:     Subjective   Patient ID: Kenisha Bergeron is a 77 y.o. female who presents for No chief complaint on file.    HPI  Session Information  Is this acupuncture treatment being billed to the patient's insurance company: Yes  This is visit number: 3  The patient has a total number of visits of: 12  Initial Acupuncture Treatment date: 09/13/23  Name of Insurance Company: Medicare A/B  Name of Supervising Physician: ELSIE Strange  Visit Type: Follow-up visit         Review of Systems         Provider reviewed plan for the acupuncture session, precautions and contraindications. Patient/guardian/hospital staff has given consent to treat with full understanding of what to expect during the session. Before acupuncture began, provider explained to the patient to communicate at any time if the procedure was causing discomfort past their tolerance level. Patient agreed to advise acupuncturist. The acupuncturist counseled the patient on the risks of acupuncture treatment including pain, infection, bleeding, and no relief of pain. The patient was positioned comfortably. There was no evidence of infection at the site of needle insertions.    Objective   Physical Exam         Treatment Plan  Treatment Goals:  (decrease the intensity duration and frequency of pain)  Pattern Differentiation: st qi deficiency, oketsu, adrenal imbalance  Treatment Principle: move qi and " blood regulate adrenals    Acupuncture Treatment  Patient Position: Supine on a table  Acupuncture Needling: Yes  Needle Guage: 40 guage /.16/ Red seirin  Body Points: With retention  Body Points - Left: k 7, 10  Body Points - Bilateral: gb 41,lr, 3, sj 5, li4, st qi x2  Body Points - Right: k 3, 9  Auricular Points: No  Electroacupuncture Used: No  Other Techniques Utilized: Topicals  Topicals Description: orange peppermint with spirit quieting oil  Needle Count In: 16  Needle Count Out: 16  Needle Retention Time (min): 25 minutes  Total Face to Face Time (min): 45 minutes              Assessment/Plan   Acupuncture Visit:     Subjective   Patient ID: Kenisha Bergeron is a 78 y.o. female who presents for No chief complaint on file.  HPI    Session Information  Is this acupuncture treatment being billed to the patient's insurance company: Yes  This is visit number: 4  The patient has a total number of visits of: 12  Initial Acupuncture Treatment date: 09/13/23  Name of Insurance Company: Medicare A + B - 12 tx over 90 days + 8 additional if showing improvement  Name of Supervising Physician: Minerva Strange  Visit Type: Follow-up visit         Review of Systems         Provider reviewed plan for the acupuncture session, precautions and contraindications. Patient/guardian/hospital staff has given consent to treat with full understanding of what to expect during the session. Before acupuncture began, provider explained to the patient to communicate at any time if the procedure was causing discomfort past their tolerance level. Patient agreed to advise acupuncturist. The acupuncturist counseled the patient on the risks of acupuncture treatment including pain, infection, bleeding, and no relief of pain. The patient was positioned comfortably. There was no evidence of infection at the site of needle insertions.    Objective   Physical Exam              Acupuncture Treatment  Patient Position: Supine on a  table  Acupuncture Needling: Yes  Needle Guage: 32 guage /.25/ Purple seirin, 40 guage /.16/ Red seirin  Body Points - Left: 22.06 SI3 - SP9 1.5 SP6 4  Body Points - Bilateral: CV12 CV6 ST25 - ST36 KI10 KI7 SP3  Body Points - Right: DB/LG  Needle Count In: 20  Needle Count Out: 20  Needle Retention Time (min): 40 minutes  Total Face to Face Time (min): 30 minutes              Assessment/Plan

## 2023-12-18 ENCOUNTER — LAB (OUTPATIENT)
Dept: LAB | Facility: LAB | Age: 78
End: 2023-12-18
Payer: MEDICARE

## 2023-12-18 DIAGNOSIS — N39.0 URINARY TRACT INFECTION, SITE NOT SPECIFIED: Primary | ICD-10-CM

## 2023-12-18 LAB
APPEARANCE UR: ABNORMAL
BACTERIA #/AREA URNS AUTO: ABNORMAL /HPF
BILIRUB UR STRIP.AUTO-MCNC: NEGATIVE MG/DL
COLOR UR: YELLOW
GLUCOSE UR STRIP.AUTO-MCNC: NEGATIVE MG/DL
KETONES UR STRIP.AUTO-MCNC: NEGATIVE MG/DL
LEUKOCYTE ESTERASE UR QL STRIP.AUTO: ABNORMAL
MUCOUS THREADS #/AREA URNS AUTO: ABNORMAL /LPF
NITRITE UR QL STRIP.AUTO: NEGATIVE
PH UR STRIP.AUTO: 6 [PH]
PROT UR STRIP.AUTO-MCNC: NEGATIVE MG/DL
RBC # UR STRIP.AUTO: NEGATIVE /UL
RBC #/AREA URNS AUTO: ABNORMAL /HPF
SP GR UR STRIP.AUTO: 1.01
SQUAMOUS #/AREA URNS AUTO: ABNORMAL /HPF
UROBILINOGEN UR STRIP.AUTO-MCNC: <2 MG/DL
WBC #/AREA URNS AUTO: >50 /HPF
WBC CLUMPS #/AREA URNS AUTO: ABNORMAL /HPF

## 2023-12-18 PROCEDURE — 81001 URINALYSIS AUTO W/SCOPE: CPT

## 2023-12-20 DIAGNOSIS — N39.0 CHRONIC UTI: Primary | ICD-10-CM

## 2024-01-24 ENCOUNTER — ALLIED HEALTH (OUTPATIENT)
Dept: INTEGRATIVE MEDICINE | Facility: CLINIC | Age: 79
End: 2024-01-24
Payer: MEDICARE

## 2024-01-24 DIAGNOSIS — M54.59 OTHER LOW BACK PAIN: Primary | ICD-10-CM

## 2024-01-24 PROCEDURE — 97811 ACUP 1/> W/O ESTIM EA ADD 15: CPT | Performed by: PHYSICIAN ASSISTANT

## 2024-01-24 PROCEDURE — 97810 ACUP 1/> WO ESTIM 1ST 15 MIN: CPT | Performed by: PHYSICIAN ASSISTANT

## 2024-01-24 ASSESSMENT — ENCOUNTER SYMPTOMS: BACK PAIN: 1

## 2024-01-24 NOTE — PROGRESS NOTES
"Acupuncture Visit:     Subjective   Patient ID: Kenisha Bergeron is a 78 y.o. female who presents for Back Pain  Pt reporting that her back is ok.  She has times that her back  gets irritated when she does bend over properly.  It resolves with rest and tylenol.  On average that happens 4x/wk.      Previous:  Patient reports back pain has decreased, overall feeling better since her last acu tx. \"It went well.\"     Back Pain        Session Information  Is this acupuncture treatment being billed to the patient's insurance company: Yes  This is visit number: 1  The patient has a total number of visits of: 8  Name of Insurance Company: Medicare A + B - 12 tx over 90 days + 8 additional if showing improvement 90 days 12/13/23  Name of Supervising Physician: ELSIE Tobin  Visit Type: Follow-up visit         Review of Systems   Musculoskeletal:  Positive for back pain.            Provider reviewed plan for the acupuncture session, precautions and contraindications. Patient/guardian/hospital staff has given consent to treat with full understanding of what to expect during the session. Before acupuncture began, provider explained to the patient to communicate at any time if the procedure was causing discomfort past their tolerance level. Patient agreed to advise acupuncturist. The acupuncturist counseled the patient on the risks of acupuncture treatment including pain, infection, bleeding, and no relief of pain. The patient was positioned comfortably. There was no evidence of infection at the site of needle insertions.    Objective   Physical Exam         Treatment Plan  Treatment Goals: Pain management  Pattern Differentiation: oketsu, adrenal imbalance  Treatment Principle: move qi and blood, regulate adrenals    Acupuncture Treatment  Patient Position: Supine on a table  Needle Guage: 42 guage /.14/ Lime green seirin, 40 guage /.16/ Red seirin  Body Points: With retention  Body Points - Left: si 3, gb41  Body Points - " Bilateral: lr 3, li 4, k 3  Body Points - Right: ub62, sj5  Electroacupuncture Used: No  Needle Count In: 12  Needle Count Out: 12  Needle Retention Time (min): 25 minutes  Total Face to Face Time (min): 25 minutes              Assessment/Plan

## 2024-01-31 ENCOUNTER — ALLIED HEALTH (OUTPATIENT)
Dept: INTEGRATIVE MEDICINE | Facility: CLINIC | Age: 79
End: 2024-01-31
Payer: MEDICARE

## 2024-01-31 DIAGNOSIS — M54.59 OTHER LOW BACK PAIN: Primary | ICD-10-CM

## 2024-01-31 PROCEDURE — 97810 ACUP 1/> WO ESTIM 1ST 15 MIN: CPT | Performed by: PHYSICIAN ASSISTANT

## 2024-01-31 PROCEDURE — 97811 ACUP 1/> W/O ESTIM EA ADD 15: CPT | Performed by: PHYSICIAN ASSISTANT

## 2024-01-31 ASSESSMENT — ENCOUNTER SYMPTOMS: BACK PAIN: 1

## 2024-01-31 NOTE — PROGRESS NOTES
Acupuncture Visit:     Subjective   Patient ID: Kenisha Bergeron is a 78 y.o. female who presents for Back Pain  Pt reported that her back is improving  but it becomes irritated when she engages in xs physical activity.  It is currently active.  She also struggles with frequent urination.    Previous:  Pt reporting that her back is ok.  She has times that her back  gets irritated when she does bend over properly.  It resolves with rest and tylenol.  On average that happens 4x/wk.      Back Pain        Session Information  This is visit number: 2  The patient has a total number of visits of: 8  Name of Insurance Company: Medicare A + B - 12 tx over 90 days + 8 additional if showing improvement 90 days 12/13/23  Name of Supervising Physician: ELSIE Tobin  Visit Type: Follow-up visit         Review of Systems   Musculoskeletal:  Positive for back pain.            Provider reviewed plan for the acupuncture session, precautions and contraindications. Patient/guardian/hospital staff has given consent to treat with full understanding of what to expect during the session. Before acupuncture began, provider explained to the patient to communicate at any time if the procedure was causing discomfort past their tolerance level. Patient agreed to advise acupuncturist. The acupuncturist counseled the patient on the risks of acupuncture treatment including pain, infection, bleeding, and no relief of pain. The patient was positioned comfortably. There was no evidence of infection at the site of needle insertions.    Objective   Physical Exam         Treatment Plan  Treatment Goals: Pain management  Pattern Differentiation: adrenal imbalance  Treatment Principle: regulate adrenals    Acupuncture Treatment  Patient Position: Lateral recumbent on a table  Needle Guage: 42 guage /.14/ Lime green seirin, 40 guage /.16/ Red seirin, 36 guage /.20/ Blue seirin  Body Points: With retention  Body Points - Left: si 3, ub 62  Body Points -  Bilateral: HJJ L4,3,2, ub 23, 52  Body Points - Right: k 3  Other Techniques Utilized: TDP Lamp  Needle Count In: 13  Needle Count Out: 13  Needle Retention Time (min): 25 minutes  Total Face to Face Time (min): 25 minutes              Assessment/Plan

## 2024-01-31 NOTE — PATIENT INSTRUCTIONS
Pt tolerated tx well and given resources for pelvic  and [facility]ro-Genital Specialist (per Rere Gonzales). Advised to engage in gentle activity following tx and hydrate.  Schedule 4-6 follow tx with re-evaluation

## 2024-02-06 DIAGNOSIS — N39.0 CHRONIC UTI: ICD-10-CM

## 2024-02-07 ENCOUNTER — ALLIED HEALTH (OUTPATIENT)
Dept: INTEGRATIVE MEDICINE | Facility: CLINIC | Age: 79
End: 2024-02-07
Payer: MEDICARE

## 2024-02-07 DIAGNOSIS — M54.59 OTHER LOW BACK PAIN: Primary | ICD-10-CM

## 2024-02-07 PROCEDURE — 97811 ACUP 1/> W/O ESTIM EA ADD 15: CPT | Performed by: PHYSICIAN ASSISTANT

## 2024-02-07 PROCEDURE — 97810 ACUP 1/> WO ESTIM 1ST 15 MIN: CPT | Performed by: PHYSICIAN ASSISTANT

## 2024-02-07 ASSESSMENT — ENCOUNTER SYMPTOMS: BACK PAIN: 1

## 2024-02-07 NOTE — PROGRESS NOTES
Acupuncture Visit:     Subjective   Patient ID: Kenisha Bergeron is a 78 y.o. female who presents for Back Pain  Pt reported that her back is improving.  It continues to be irritated when lifting which is challenging because she is moving.    Previous:   Pt reported that her back is improving  but it becomes irritated when she engages in xs physical activity.  It is currently active.  She also struggles with frequent urination.      Back Pain                  Review of Systems   Musculoskeletal:  Positive for back pain.            Provider reviewed plan for the acupuncture session, precautions and contraindications. Patient/guardian/hospital staff has given consent to treat with full understanding of what to expect during the session. Before acupuncture began, provider explained to the patient to communicate at any time if the procedure was causing discomfort past their tolerance level. Patient agreed to advise acupuncturist. The acupuncturist counseled the patient on the risks of acupuncture treatment including pain, infection, bleeding, and no relief of pain. The patient was positioned comfortably. There was no evidence of infection at the site of needle insertions.    Objective   Physical Exam                             Assessment/Plan

## 2024-02-08 RX ORDER — METHENAMINE HIPPURATE 1000 MG/1
1 TABLET ORAL 2 TIMES DAILY
Qty: 60 TABLET | Refills: 2 | Status: SHIPPED | OUTPATIENT
Start: 2024-02-08 | End: 2024-05-08

## 2024-07-09 DIAGNOSIS — N39.0 CHRONIC UTI: Primary | ICD-10-CM

## 2024-07-09 RX ORDER — METHENAMINE HIPPURATE 1000 MG/1
1 TABLET ORAL 2 TIMES DAILY
Qty: 60 TABLET | Refills: 2 | Status: SHIPPED | OUTPATIENT
Start: 2024-07-09 | End: 2024-10-07

## 2024-09-20 DIAGNOSIS — N39.41 URGE INCONTINENCE: ICD-10-CM

## 2024-09-20 RX ORDER — VIBEGRON 75 MG/1
1 TABLET, FILM COATED ORAL DAILY
Qty: 30 TABLET | Refills: 11 | Status: SHIPPED | OUTPATIENT
Start: 2024-09-20 | End: 2025-09-20

## 2025-02-07 DIAGNOSIS — N39.41 URGE INCONTINENCE: ICD-10-CM

## 2025-02-11 RX ORDER — OXYBUTYNIN CHLORIDE 15 MG/1
TABLET, EXTENDED RELEASE ORAL
Refills: 0 | OUTPATIENT
Start: 2025-02-11